# Patient Record
Sex: MALE | Race: WHITE | Employment: FULL TIME | URBAN - METROPOLITAN AREA
[De-identification: names, ages, dates, MRNs, and addresses within clinical notes are randomized per-mention and may not be internally consistent; named-entity substitution may affect disease eponyms.]

---

## 2022-02-01 ENCOUNTER — HOSPITAL ENCOUNTER (OUTPATIENT)
Age: 32
Discharge: HOME | End: 2022-02-01
Payer: COMMERCIAL

## 2022-02-01 ENCOUNTER — APPOINTMENT (OUTPATIENT)
Dept: GENERAL RADIOLOGY | Age: 32
End: 2022-02-01
Attending: PHYSICIAN ASSISTANT
Payer: COMMERCIAL

## 2022-02-01 VITALS
WEIGHT: 175 LBS | RESPIRATION RATE: 18 BRPM | HEART RATE: 84 BPM | SYSTOLIC BLOOD PRESSURE: 127 MMHG | TEMPERATURE: 98 F | HEIGHT: 71 IN | BODY MASS INDEX: 24.5 KG/M2 | DIASTOLIC BLOOD PRESSURE: 84 MMHG | OXYGEN SATURATION: 98 %

## 2022-02-01 DIAGNOSIS — M25.532 LEFT WRIST PAIN: ICD-10-CM

## 2022-02-01 DIAGNOSIS — S60.212A CONTUSION OF LEFT WRIST, INITIAL ENCOUNTER: Primary | ICD-10-CM

## 2022-02-01 PROCEDURE — 73110 X-RAY EXAM OF WRIST: CPT | Performed by: PHYSICIAN ASSISTANT

## 2022-02-01 PROCEDURE — 99203 OFFICE O/P NEW LOW 30 MIN: CPT | Performed by: PHYSICIAN ASSISTANT

## 2022-02-01 PROCEDURE — L3924 HFO WITHOUT JOINTS PRE OTS: HCPCS | Performed by: PHYSICIAN ASSISTANT

## 2022-02-01 NOTE — ED INITIAL ASSESSMENT (HPI)
Pt fell off work stool on Sunday laning onto l arm, pt stating that his L hand and wrist were swollen but he did not have pain, swelling down today but pain increasing, reddness note to hand

## 2023-06-30 ENCOUNTER — HOSPITAL ENCOUNTER (OUTPATIENT)
Age: 33
Discharge: HOME | End: 2023-06-30
Payer: COMMERCIAL

## 2023-06-30 VITALS
WEIGHT: 195 LBS | RESPIRATION RATE: 18 BRPM | TEMPERATURE: 98 F | OXYGEN SATURATION: 99 % | DIASTOLIC BLOOD PRESSURE: 90 MMHG | SYSTOLIC BLOOD PRESSURE: 143 MMHG | BODY MASS INDEX: 27.3 KG/M2 | HEART RATE: 68 BPM | HEIGHT: 71 IN

## 2023-06-30 DIAGNOSIS — M54.16 LUMBAR RADICULOPATHY: Primary | ICD-10-CM

## 2023-06-30 PROCEDURE — 99213 OFFICE O/P EST LOW 20 MIN: CPT | Performed by: PHYSICIAN ASSISTANT

## 2023-06-30 RX ORDER — PREDNISONE 10 MG/1
TABLET ORAL
Qty: 20 TABLET | Refills: 0 | Status: SHIPPED | OUTPATIENT
Start: 2023-06-30

## 2023-07-10 ENCOUNTER — OFFICE VISIT (OUTPATIENT)
Dept: FAMILY MEDICINE CLINIC | Facility: CLINIC | Age: 33
End: 2023-07-10
Payer: COMMERCIAL

## 2023-07-10 VITALS
OXYGEN SATURATION: 98 % | WEIGHT: 196 LBS | HEART RATE: 72 BPM | DIASTOLIC BLOOD PRESSURE: 76 MMHG | RESPIRATION RATE: 16 BRPM | SYSTOLIC BLOOD PRESSURE: 112 MMHG | HEIGHT: 71 IN | BODY MASS INDEX: 27.44 KG/M2

## 2023-07-10 DIAGNOSIS — M54.41 ACUTE RIGHT-SIDED LOW BACK PAIN WITH RIGHT-SIDED SCIATICA: Primary | ICD-10-CM

## 2023-07-10 PROCEDURE — 3074F SYST BP LT 130 MM HG: CPT | Performed by: FAMILY MEDICINE

## 2023-07-10 PROCEDURE — 3078F DIAST BP <80 MM HG: CPT | Performed by: FAMILY MEDICINE

## 2023-07-10 PROCEDURE — 99203 OFFICE O/P NEW LOW 30 MIN: CPT | Performed by: FAMILY MEDICINE

## 2023-07-10 PROCEDURE — 3008F BODY MASS INDEX DOCD: CPT | Performed by: FAMILY MEDICINE

## 2023-07-10 RX ORDER — CYCLOBENZAPRINE HCL 10 MG
10 TABLET ORAL 3 TIMES DAILY PRN
Qty: 30 TABLET | Refills: 1 | Status: SHIPPED | OUTPATIENT
Start: 2023-07-10 | End: 2023-07-30

## 2023-07-10 RX ORDER — NAPROXEN 500 MG/1
500 TABLET ORAL 2 TIMES DAILY WITH MEALS
Qty: 28 TABLET | Refills: 0 | Status: SHIPPED | OUTPATIENT
Start: 2023-07-10

## 2023-07-14 ENCOUNTER — TELEPHONE (OUTPATIENT)
Dept: PHYSICAL THERAPY | Facility: HOSPITAL | Age: 33
End: 2023-07-14

## 2023-07-16 ENCOUNTER — PATIENT MESSAGE (OUTPATIENT)
Dept: FAMILY MEDICINE CLINIC | Facility: CLINIC | Age: 33
End: 2023-07-16

## 2023-07-16 DIAGNOSIS — M54.41 ACUTE RIGHT-SIDED LOW BACK PAIN WITH RIGHT-SIDED SCIATICA: Primary | ICD-10-CM

## 2023-07-17 ENCOUNTER — HOSPITAL ENCOUNTER (EMERGENCY)
Facility: HOSPITAL | Age: 33
Discharge: HOME | End: 2023-07-17
Attending: EMERGENCY MEDICINE
Payer: COMMERCIAL

## 2023-07-17 VITALS
SYSTOLIC BLOOD PRESSURE: 142 MMHG | DIASTOLIC BLOOD PRESSURE: 80 MMHG | HEIGHT: 71 IN | OXYGEN SATURATION: 99 % | BODY MASS INDEX: 27.3 KG/M2 | WEIGHT: 195 LBS | TEMPERATURE: 98 F | RESPIRATION RATE: 18 BRPM | HEART RATE: 90 BPM

## 2023-07-17 DIAGNOSIS — M54.16 LUMBAR RADICULOPATHY: Primary | ICD-10-CM

## 2023-07-17 PROCEDURE — 96372 THER/PROPH/DIAG INJ SC/IM: CPT

## 2023-07-17 PROCEDURE — 99283 EMERGENCY DEPT VISIT LOW MDM: CPT

## 2023-07-17 PROCEDURE — 99284 EMERGENCY DEPT VISIT MOD MDM: CPT

## 2023-07-17 RX ORDER — METHOCARBAMOL 750 MG/1
750 TABLET, FILM COATED ORAL ONCE
Status: COMPLETED | OUTPATIENT
Start: 2023-07-17 | End: 2023-07-17

## 2023-07-17 RX ORDER — KETOROLAC TROMETHAMINE 30 MG/ML
60 INJECTION, SOLUTION INTRAMUSCULAR; INTRAVENOUS ONCE
Status: COMPLETED | OUTPATIENT
Start: 2023-07-17 | End: 2023-07-17

## 2023-07-17 RX ORDER — DEXAMETHASONE 4 MG/1
10 TABLET ORAL ONCE
Status: COMPLETED | OUTPATIENT
Start: 2023-07-17 | End: 2023-07-17

## 2023-07-17 RX ORDER — METHOCARBAMOL 750 MG/1
750 TABLET, FILM COATED ORAL 3 TIMES DAILY
Qty: 15 TABLET | Refills: 0 | Status: SHIPPED | OUTPATIENT
Start: 2023-07-17 | End: 2023-07-28

## 2023-07-17 RX ORDER — LIDOCAINE 50 MG/G
1 PATCH TOPICAL EVERY 24 HOURS
Qty: 12 EACH | Refills: 0 | Status: SHIPPED | OUTPATIENT
Start: 2023-07-17 | End: 2023-07-28

## 2023-07-17 RX ORDER — HYDROCODONE BITARTRATE AND ACETAMINOPHEN 5; 325 MG/1; MG/1
1 TABLET ORAL ONCE
Status: COMPLETED | OUTPATIENT
Start: 2023-07-17 | End: 2023-07-17

## 2023-07-17 NOTE — DISCHARGE INSTRUCTIONS
Return for new or worsening symptoms such as weakness, complete numbness especially in the groin, incontinence, fever, vomiting or abdominal pain. Followup with primary care as you may need further testing and/or treatment such as physical therapy or MRI if your symptoms do not improve. You can take acetaminophen 500 mg every 4-6 hours for pain as needed in addition to the naproxen.

## 2023-07-17 NOTE — ED INITIAL ASSESSMENT (HPI)
Patient to ED with c/o right lower back pain that radiates down into his leg. Patient states pain began in June and has seen doctor multiple times for same complaint. Steroids and muscle relaxers given without relief.

## 2023-07-18 ENCOUNTER — PATIENT OUTREACH (OUTPATIENT)
Dept: CASE MANAGEMENT | Age: 33
End: 2023-07-18

## 2023-07-18 ENCOUNTER — TELEPHONE (OUTPATIENT)
Dept: FAMILY MEDICINE CLINIC | Facility: CLINIC | Age: 33
End: 2023-07-18

## 2023-07-18 NOTE — TELEPHONE ENCOUNTER
From: Sheree Pena  To: Marzena Jones MD  Sent: 7/16/2023 10:11 AM CDT  Subject: Continued Severe Pain    Good morning,    I was recently seen for severe back pain radiating to my leg. Previously to my office visit I was seen at urgent care and was prescribed steroids. I finished the steroid medication and have been taking the medication prescribed in your office including the muscle relaxers. I have also seen a chiropractor 4 times. I am starting physical therapy on Wednesday (earliest appointment day they had). The pain continues to be unbearable and I am having a very difficult time walking without limping and sitting. I have been doing exercises at home and that has not been helping either. I am also wearing a support back belt at all times. Is there anything else that I can do for the pain? Do I need to wait for the PT to finish to get an MRI? This pain has been severe and consistent for 7 weeks now and I am quite worried. I was wondering if i need to have any possible bloodwork done and if that would potentially provide any answers. Please advise.

## 2023-07-18 NOTE — TELEPHONE ENCOUNTER
SEEN  YESTERDAY IN ER FOR BACK PAIN. [facility]. WIFE IS CALLING BECAUSE SHE WANTS NORCO [facility] UNITL THEY SEE DR SALES FOR THE F/U VISIT. SHE IS AFRAID THE BACK PAIN WILL BE COMING BACK AND HE WON'T BE ABLE TO HANDLE THE PAIN.       [facility] AND [facility]

## 2023-07-18 NOTE — PROGRESS NOTES
1st attempt ED f/up apt request    Dr. Kandy Harper   PCP  [address]  [address]  Follow up in 1 week  Apt: July 28 @10am         Ryland Stokes [address] [phone]  Get PCP ref to schedule     Confirmed w pt  Closing encounter

## 2023-07-18 NOTE — TELEPHONE ENCOUNTER
Pt in ER for back pain  Given methocarbamol and lidocaine patch for home  Given norco x1 tab in ER    Has f/u appt 7/28/23    Requesting Gene Alarcon until appt. Approve/deny?

## 2023-07-19 ENCOUNTER — OFFICE VISIT (OUTPATIENT)
Facility: LOCATION | Age: 33
End: 2023-07-19
Attending: FAMILY MEDICINE
Payer: COMMERCIAL

## 2023-07-19 DIAGNOSIS — M54.41 ACUTE RIGHT-SIDED LOW BACK PAIN WITH RIGHT-SIDED SCIATICA: Primary | ICD-10-CM

## 2023-07-19 PROCEDURE — 97110 THERAPEUTIC EXERCISES: CPT

## 2023-07-19 PROCEDURE — 97161 PT EVAL LOW COMPLEX 20 MIN: CPT

## 2023-07-19 RX ORDER — HYDROCODONE BITARTRATE AND ACETAMINOPHEN 5; 325 MG/1; MG/1
1 TABLET ORAL EVERY 6 HOURS PRN
Qty: 20 TABLET | Refills: 0 | Status: SHIPPED | OUTPATIENT
Start: 2023-07-19 | End: 2023-07-24

## 2023-07-23 ENCOUNTER — PATIENT MESSAGE (OUTPATIENT)
Dept: FAMILY MEDICINE CLINIC | Facility: CLINIC | Age: 33
End: 2023-07-23

## 2023-07-23 DIAGNOSIS — M54.41 ACUTE RIGHT-SIDED LOW BACK PAIN WITH RIGHT-SIDED SCIATICA: ICD-10-CM

## 2023-07-24 RX ORDER — HYDROCODONE BITARTRATE AND ACETAMINOPHEN 5; 325 MG/1; MG/1
1 TABLET ORAL EVERY 6 HOURS PRN
Qty: 20 TABLET | Refills: 0 | Status: SHIPPED | OUTPATIENT
Start: 2023-07-24

## 2023-07-24 NOTE — TELEPHONE ENCOUNTER
From: Silva Gonzalez  To: Chino Pillai MD  Sent: 7/23/2023 9:45 AM CDT  Subject: Medication Refill    Good morning,    I am reaching out regarding my Hydrocodone prescription. I only have enough until tomorrow. This is the only thing that lessens my pain so that I can walk, sit and do my exercises at home. My pain was so severe at my last physical therapy appointment (before the medication) that the therapist was not able to do much with me. The pain is not lessening even with me doing the exercises the therapist showed me to do at home and continued chiropractic care. Are you able to refill the prescription so that I have enough for pain management until I come in for my visit on Friday? Also, is there any way to request bloodwork to be done before I come in on Friday? I would like to start ruling some things out as this weekend marks the 8th week of this pain with no improvement.      Thank you,    Mat

## 2023-07-26 ENCOUNTER — OFFICE VISIT (OUTPATIENT)
Facility: LOCATION | Age: 33
End: 2023-07-26
Attending: FAMILY MEDICINE
Payer: COMMERCIAL

## 2023-07-26 PROCEDURE — 97140 MANUAL THERAPY 1/> REGIONS: CPT

## 2023-07-26 PROCEDURE — 97110 THERAPEUTIC EXERCISES: CPT

## 2023-07-26 NOTE — PROGRESS NOTES
Diagnosis:   Acute right-sided low back pain with right-sided sciatica (M54.41)    Referring Provider: Sharif Escalante  Date of Evaluation:    7/19/23    Precautions:  None Next MD visit:   Date of Surgery: n/a   Insurance Primary/Secondary: [employer] / N/A     # Auth Visits: 12            Subjective: reached out to physician and is now on stronger pain medication; doing exercises from PT & chiropractor     Pain: 5/10      Objective:   See flowsheet      Assessment:   Significant neural tension throughout (R) LE. Very tight HS and posterior hip musculature. Pt does get some relief with prone lying while he is working at home as well as some symptom improvement with lumbar extension exercise. Fair tolerance to exercises.  Does demonstrate improved ambulatory pattern however not sure if this is treatment related versus pain medication      Goals:   Pt will improve transversus abdominis recruitment to perform proper isometric contraction without requiring verbal or tactile cuing to promote advancement of therex   Pt will demonstrate good understanding of proper posture and body mechanics to decrease pain and improve spinal safety   Pt will report improved symptom centralization and absence of radicular symptoms for 3 consecutive days to improve function with ADL   Pt will have decreased paraspinal mm tension to tolerate standing >30 minutes for home activities   Pt will demonstrate at least 4/5 (R) ankle PF strength in order to optimize walking, steps, etc  Pt will demonstrate at least 4/5 (B) hip strength in order to maximize effectiveness and efficiency with functional activities   Pt will demonstrate ability to cross leg in order to don/doff socks and shoes with less restriction and pain  Pt will perform all ADLs and light household chores/tasks with less pain and restriction  Pt will verbalize and demonstrate proper body mechanics to  things from lower level surfaces  Pt will be able to get into and out of car with more ease and less pain  Pt will be able to sit (with intermittent breaks) at work for 6-8 hour day with less reported pain and use of lumbar support   Pt will be independent and compliant with comprehensive HEP to maintain progress achieved in PT      Plan: continue to work on centralizing symptoms, improve LE flexibility/mobility  Date: 7/26/2023  TX#: 2/12 Date:                 TX#: 3/ Date:                 TX#: 4/ Date:                 TX#: 5/ Date: Tx#: 6/   Manual Therapy       STM/massage gun to (R) posterior hip musculature        TherEx       Prone on elbows x 1 min       Prone press up x 20       PT assisted piriformis & HS stretch       Supine HS stretch 5 seconds x 20       Supine sciatic nerve glide on YPB x 30                     HEP: Prone on elbows, prone press up, standing lumbar extension, supine piriformis stretch    Charges: Manual 1;  TherEx 2       Total Timed Treatment: 45 min  Total Treatment Time: 45 min

## 2023-07-28 ENCOUNTER — OFFICE VISIT (OUTPATIENT)
Facility: LOCATION | Age: 33
End: 2023-07-28
Attending: FAMILY MEDICINE
Payer: COMMERCIAL

## 2023-07-28 ENCOUNTER — HOSPITAL ENCOUNTER (OUTPATIENT)
Dept: GENERAL RADIOLOGY | Age: 33
Discharge: HOME | End: 2023-07-28
Attending: FAMILY MEDICINE
Payer: COMMERCIAL

## 2023-07-28 ENCOUNTER — OFFICE VISIT (OUTPATIENT)
Dept: FAMILY MEDICINE CLINIC | Facility: CLINIC | Age: 33
End: 2023-07-28
Payer: COMMERCIAL

## 2023-07-28 VITALS
BODY MASS INDEX: 27.3 KG/M2 | WEIGHT: 195 LBS | OXYGEN SATURATION: 98 % | RESPIRATION RATE: 20 BRPM | SYSTOLIC BLOOD PRESSURE: 144 MMHG | DIASTOLIC BLOOD PRESSURE: 94 MMHG | HEIGHT: 71 IN | HEART RATE: 82 BPM

## 2023-07-28 DIAGNOSIS — M54.16 LUMBAR RADICULOPATHY: ICD-10-CM

## 2023-07-28 DIAGNOSIS — Z00.00 LABORATORY EXAMINATION ORDERED AS PART OF A COMPLETE PHYSICAL EXAMINATION: ICD-10-CM

## 2023-07-28 DIAGNOSIS — R29.898 RIGHT LEG WEAKNESS: ICD-10-CM

## 2023-07-28 DIAGNOSIS — M54.16 LUMBAR RADICULOPATHY: Primary | ICD-10-CM

## 2023-07-28 PROCEDURE — 72110 X-RAY EXAM L-2 SPINE 4/>VWS: CPT | Performed by: FAMILY MEDICINE

## 2023-07-28 PROCEDURE — 3077F SYST BP >= 140 MM HG: CPT | Performed by: FAMILY MEDICINE

## 2023-07-28 PROCEDURE — 3008F BODY MASS INDEX DOCD: CPT | Performed by: FAMILY MEDICINE

## 2023-07-28 PROCEDURE — 97140 MANUAL THERAPY 1/> REGIONS: CPT

## 2023-07-28 PROCEDURE — 3080F DIAST BP >= 90 MM HG: CPT | Performed by: FAMILY MEDICINE

## 2023-07-28 PROCEDURE — 99213 OFFICE O/P EST LOW 20 MIN: CPT | Performed by: FAMILY MEDICINE

## 2023-07-28 PROCEDURE — 97110 THERAPEUTIC EXERCISES: CPT

## 2023-07-28 RX ORDER — MELOXICAM 15 MG/1
15 TABLET ORAL DAILY PRN
Qty: 30 TABLET | Refills: 0 | Status: SHIPPED | OUTPATIENT
Start: 2023-07-28

## 2023-07-28 RX ORDER — HYDROCODONE BITARTRATE AND ACETAMINOPHEN 7.5; 325 MG/1; MG/1
1 TABLET ORAL EVERY 8 HOURS PRN
Qty: 24 TABLET | Refills: 0 | Status: SHIPPED | OUTPATIENT
Start: 2023-07-28

## 2023-07-28 NOTE — PROGRESS NOTES
Subjective:   Patient ID: Renay Hinds is a 35year old male. June 3rd with started with back pain. Pain 9/10. Got ping pong table and taking it down the stairs. Pain radiating down the right leg.  + numbness and tingling. No previous back injuries. Hx of occasional low back pain that self resolved in 2 weeks, but no history of radicular symptoms. Done PT x 2 weeks, chiropracter x 5 weeks, done steroids twice, done muscle relaxer, nsaids, and norco, all without any significant relief. History/Other:   Review of Systems   All other systems reviewed and are negative. Current Outpatient Medications   Medication Sig Dispense Refill    Meloxicam 15 MG Oral Tab Take 1 tablet (15 mg total) by mouth daily as needed for Pain. 30 tablet 0    HYDROcodone-acetaminophen (NORCO) 7.5-325 MG Oral Tab Take 1 tablet by mouth every 8 (eight) hours as needed for Pain. 24 tablet 0    HYDROcodone-acetaminophen (NORCO) 5-325 MG Oral Tab Take 1 tablet by mouth every 6 (six) hours as needed for Pain. 20 tablet 0    naproxen 500 MG Oral Tab Take 1 tablet (500 mg total) by mouth 2 (two) times daily with meals. 28 tablet 0    cyclobenzaprine 10 MG Oral Tab Take 1 tablet (10 mg total) by mouth 3 (three) times daily as needed. 30 tablet 1     Allergies:No Known Allergies    Objective:   Physical Exam  Vitals reviewed. Constitutional:       General: He is not in acute distress. Appearance: He is well-developed. He is not diaphoretic. Eyes:      General: No scleral icterus. Right eye: No discharge. Left eye: No discharge. Conjunctiva/sclera: Conjunctivae normal.   Cardiovascular:      Rate and Rhythm: Normal rate and regular rhythm. Heart sounds: Normal heart sounds. Pulmonary:      Effort: Pulmonary effort is normal. No respiratory distress. Breath sounds: Normal breath sounds. No wheezing or rales. Musculoskeletal:      Comments: Unable to do ROM fully due to pain.   Pacing constantly as unable to sit or stand still as pain is then worse. Tenderness midline, causing pain to shoot down into right post thigh. Straight leg test causing pain into the right leg. Weakness in right leg extension and pushing off on right heel. Assessment & Plan:   Lumbar radiculopathy  (primary encounter diagnosis)  Right leg weakness  Laboratory examination ordered as part of a complete physical examination  + straight leg test. + right leg weakness. Already done chiropracter, nsaids, muscle relaxer, oral steroids. Started PT 3 visit already with no relief. Needs MRI at this point. Orders Placed This Encounter      Lipid Panel      CBC With Differential With Platelet      Comp Metabolic Panel (14)      TSH W Reflex To Free T4      Meds This Visit:  Requested Prescriptions     Signed Prescriptions Disp Refills    Meloxicam 15 MG Oral Tab 30 tablet 0     Sig: Take 1 tablet (15 mg total) by mouth daily as needed for Pain. HYDROcodone-acetaminophen (NORCO) 7.5-325 MG Oral Tab 24 tablet 0     Sig: Take 1 tablet by mouth every 8 (eight) hours as needed for Pain.        Imaging & Referrals:  OP REFERRAL PAIN MANGEMENT  XR LUMBAR SPINE (MIN 4 VIEWS) (CPT=72110)  MRI SPINE LUMBAR (CPT=72148)

## 2023-07-28 NOTE — PROGRESS NOTES
Diagnosis:   Acute right-sided low back pain with right-sided sciatica (M54.41)    Referring Provider: Marilou Encarnacion  Date of Evaluation:    7/19/23    Precautions:  None Next MD visit:   Date of Surgery: n/a   Insurance Primary/Secondary: [employer] / N/A     # Auth Visits: 12            Subjective: feels about the same; pain shooting down back of (R) leg     Pain: 7/10      Objective:   See flowsheet      Assessment:   Significant tightness (R) posterolateral hip, HS, calf. Neural tension throughout (R) LE.  Does get some relief with piriformis release, posterior hip stretching and press up activities   Antalgic gait persists, very weak plantar flexion strength        Goals:   Pt will improve transversus abdominis recruitment to perform proper isometric contraction without requiring verbal or tactile cuing to promote advancement of therex   Pt will demonstrate good understanding of proper posture and body mechanics to decrease pain and improve spinal safety   Pt will report improved symptom centralization and absence of radicular symptoms for 3 consecutive days to improve function with ADL   Pt will have decreased paraspinal mm tension to tolerate standing >30 minutes for home activities   Pt will demonstrate at least 4/5 (R) ankle PF strength in order to optimize walking, steps, etc  Pt will demonstrate at least 4/5 (B) hip strength in order to maximize effectiveness and efficiency with functional activities   Pt will demonstrate ability to cross leg in order to don/doff socks and shoes with less restriction and pain  Pt will perform all ADLs and light household chores/tasks with less pain and restriction  Pt will verbalize and demonstrate proper body mechanics to  things from lower level surfaces  Pt will be able to get into and out of car with more ease and less pain  Pt will be able to sit (with intermittent breaks) at work for 6-8 hour day with less reported pain and use of lumbar support   Pt will be independent and compliant with comprehensive HEP to maintain progress achieved in PT      Plan: continue to work on centralizing symptoms, improve LE flexibility/mobility  Date: 7/26/2023  TX#: 2/12 Date: 7/28/23               TX#: 3/12 Date:                 TX#: 4/ Date:                 TX#: 5/ Date: Tx#: 6/   Manual Therapy Manual Therapy      STM/massage gun to (R) posterior hip musculature  STM/massage gun to (R) posterior hip musculature       TherEx TherEx      Prone on elbows x 1 min Prone on elbows x 1 min      Prone press up x 20 Prone press up x 20      PT assisted piriformis & HS stretch PT assisted piriformis & HS stretch      Supine HS stretch 5 seconds x 20 Supine HS stretch 5 seconds x 20      Supine sciatic nerve glide on YPB x 30 Supine sciatic nerve glide on YPB x 30       Side glides at wall x 20       SB calf stretch 30 sec x 3      HEP: Prone on elbows, prone press up, standing lumbar extension, supine piriformis stretch    Charges: Manual 1;  TherEx 2       Total Timed Treatment: 45 min  Total Treatment Time: 45 min

## 2023-07-29 ENCOUNTER — LAB ENCOUNTER (OUTPATIENT)
Dept: LAB | Facility: HOSPITAL | Age: 33
End: 2023-07-29
Attending: FAMILY MEDICINE
Payer: COMMERCIAL

## 2023-07-29 DIAGNOSIS — Z00.00 LABORATORY EXAMINATION ORDERED AS PART OF A COMPLETE PHYSICAL EXAMINATION: ICD-10-CM

## 2023-07-29 LAB
ALBUMIN SERPL-MCNC: 4.3 G/DL (ref 3.4–5)
ALBUMIN/GLOB SERPL: 1.1 {RATIO} (ref 1–2)
ALP LIVER SERPL-CCNC: 68 U/L
ALT SERPL-CCNC: 31 U/L
ANION GAP SERPL CALC-SCNC: 4 MMOL/L (ref 0–18)
AST SERPL-CCNC: 11 U/L (ref 15–37)
BASOPHILS # BLD AUTO: 0.08 X10(3) UL (ref 0–0.2)
BASOPHILS NFR BLD AUTO: 1 %
BILIRUB SERPL-MCNC: 0.5 MG/DL (ref 0.1–2)
BUN BLD-MCNC: 24 MG/DL (ref 7–18)
CALCIUM BLD-MCNC: 9.1 MG/DL (ref 8.5–10.1)
CHLORIDE SERPL-SCNC: 106 MMOL/L (ref 98–112)
CHOLEST SERPL-MCNC: 157 MG/DL (ref ?–200)
CO2 SERPL-SCNC: 27 MMOL/L (ref 21–32)
CREAT BLD-MCNC: 1.03 MG/DL
EGFRCR SERPLBLD CKD-EPI 2021: 98 ML/MIN/1.73M2 (ref 60–?)
EOSINOPHIL # BLD AUTO: 0.55 X10(3) UL (ref 0–0.7)
EOSINOPHIL NFR BLD AUTO: 6.7 %
ERYTHROCYTE [DISTWIDTH] IN BLOOD BY AUTOMATED COUNT: 12.6 %
FASTING PATIENT LIPID ANSWER: YES
FASTING STATUS PATIENT QL REPORTED: YES
GLOBULIN PLAS-MCNC: 3.8 G/DL (ref 2.8–4.4)
GLUCOSE BLD-MCNC: 100 MG/DL (ref 70–99)
HCT VFR BLD AUTO: 45.1 %
HDLC SERPL-MCNC: 45 MG/DL (ref 40–59)
HGB BLD-MCNC: 14.9 G/DL
IMM GRANULOCYTES # BLD AUTO: 0.02 X10(3) UL (ref 0–1)
IMM GRANULOCYTES NFR BLD: 0.2 %
LDLC SERPL CALC-MCNC: 95 MG/DL (ref ?–100)
LYMPHOCYTES # BLD AUTO: 2.62 X10(3) UL (ref 1–4)
LYMPHOCYTES NFR BLD AUTO: 31.9 %
MCH RBC QN AUTO: 28.8 PG (ref 26–34)
MCHC RBC AUTO-ENTMCNC: 33 G/DL (ref 31–37)
MCV RBC AUTO: 87.2 FL
MONOCYTES # BLD AUTO: 0.75 X10(3) UL (ref 0.1–1)
MONOCYTES NFR BLD AUTO: 9.1 %
NEUTROPHILS # BLD AUTO: 4.2 X10 (3) UL (ref 1.5–7.7)
NEUTROPHILS # BLD AUTO: 4.2 X10(3) UL (ref 1.5–7.7)
NEUTROPHILS NFR BLD AUTO: 51.1 %
NONHDLC SERPL-MCNC: 112 MG/DL (ref ?–130)
OSMOLALITY SERPL CALC.SUM OF ELEC: 288 MOSM/KG (ref 275–295)
PLATELET # BLD AUTO: 294 10(3)UL (ref 150–450)
POTASSIUM SERPL-SCNC: 4.7 MMOL/L (ref 3.5–5.1)
PROT SERPL-MCNC: 8.1 G/DL (ref 6.4–8.2)
RBC # BLD AUTO: 5.17 X10(6)UL
SODIUM SERPL-SCNC: 137 MMOL/L (ref 136–145)
TRIGL SERPL-MCNC: 89 MG/DL (ref 30–149)
TSI SER-ACNC: 1.28 MIU/ML (ref 0.36–3.74)
VLDLC SERPL CALC-MCNC: 15 MG/DL (ref 0–30)
WBC # BLD AUTO: 8.2 X10(3) UL (ref 4–11)

## 2023-07-29 PROCEDURE — 84443 ASSAY THYROID STIM HORMONE: CPT

## 2023-07-29 PROCEDURE — 80061 LIPID PANEL: CPT

## 2023-07-29 PROCEDURE — 85025 COMPLETE CBC W/AUTO DIFF WBC: CPT

## 2023-07-29 PROCEDURE — 80053 COMPREHEN METABOLIC PANEL: CPT

## 2023-07-29 PROCEDURE — 36415 COLL VENOUS BLD VENIPUNCTURE: CPT

## 2023-08-01 ENCOUNTER — OFFICE VISIT (OUTPATIENT)
Facility: LOCATION | Age: 33
End: 2023-08-01
Attending: FAMILY MEDICINE
Payer: COMMERCIAL

## 2023-08-01 PROCEDURE — 97110 THERAPEUTIC EXERCISES: CPT

## 2023-08-01 PROCEDURE — 97140 MANUAL THERAPY 1/> REGIONS: CPT

## 2023-08-01 NOTE — PROGRESS NOTES
Diagnosis:   Acute right-sided low back pain with right-sided sciatica (M54.41)    Referring Provider: Montrell Parrish  Date of Evaluation:    7/19/23    Precautions:  None Next MD visit:   Date of Surgery: n/a   Insurance Primary/Secondary: [place] / N/A     # Auth Visits: 12            Subjective: a bit more flexible but still in same pain; scheduled for MRI on Friday    Pain: 7/10      Objective:   See flowsheet      Assessment:   Significant tightness (R) posterolateral hip, HS, calf. Dural tension and hyperirritability   Very weak PF. Lumbar extension helps with low back pain but still having symptoms down the leg.   Reports some modest improvement in flexibility  Using lumbar support for sitting         Goals:   Pt will improve transversus abdominis recruitment to perform proper isometric contraction without requiring verbal or tactile cuing to promote advancement of therex   Pt will demonstrate good understanding of proper posture and body mechanics to decrease pain and improve spinal safety   Pt will report improved symptom centralization and absence of radicular symptoms for 3 consecutive days to improve function with ADL   Pt will have decreased paraspinal mm tension to tolerate standing >30 minutes for home activities   Pt will demonstrate at least 4/5 (R) ankle PF strength in order to optimize walking, steps, etc  Pt will demonstrate at least 4/5 (B) hip strength in order to maximize effectiveness and efficiency with functional activities   Pt will demonstrate ability to cross leg in order to don/doff socks and shoes with less restriction and pain  Pt will perform all ADLs and light household chores/tasks with less pain and restriction  Pt will verbalize and demonstrate proper body mechanics to  things from lower level surfaces  Pt will be able to get into and out of car with more ease and less pain  Pt will be able to sit (with intermittent breaks) at work for 6-8 hour day with less reported pain and use of lumbar support   Pt will be independent and compliant with comprehensive HEP to maintain progress achieved in PT      Plan: continue to work on centralizing symptoms, improve LE flexibility/mobility  Date: 7/26/2023  TX#: 2/12 Date: 7/28/23               TX#: 3/12 Date:  8/1/23               TX#: 4/12 Date:                 TX#: 5/ Date: Tx#: 6/   Manual Therapy Manual Therapy Manual Therapy     STM/massage gun to (R) posterior hip musculature  STM/massage gun to (R) posterior hip musculature  STM/massage gun to (R) posterior hip musculature      TherEx TherEx TherEx     Prone on elbows x 1 min Prone on elbows x 1 min Prone press ups x 30     Prone press up x 20 Prone press up x 20 Prone on elbows x 1 min     PT assisted piriformis & HS stretch PT assisted piriformis & HS stretch PPU w/knee bent x 10     Supine HS stretch 5 seconds x 20 Supine HS stretch 5 seconds x 20 Supine HS stretch 5 sec x 20     Supine sciatic nerve glide on YPB x 30 Supine sciatic nerve glide on YPB x 30 Supine sciatic nerve glide on YPB x 30      Side glides at wall x 20 Supine piriformis stretch 30 sec x 3      SB calf stretch 30 sec x 3      HEP: Prone on elbows, prone press up, standing lumbar extension, supine piriformis stretch    Charges: Manual 1;  TherEx 2       Total Timed Treatment: 45 min  Total Treatment Time: 45 min

## 2023-08-04 ENCOUNTER — HOSPITAL ENCOUNTER (OUTPATIENT)
Dept: MRI IMAGING | Facility: HOSPITAL | Age: 33
Discharge: HOME | End: 2023-08-04
Attending: FAMILY MEDICINE
Payer: COMMERCIAL

## 2023-08-04 ENCOUNTER — OFFICE VISIT (OUTPATIENT)
Facility: LOCATION | Age: 33
End: 2023-08-04
Attending: FAMILY MEDICINE
Payer: COMMERCIAL

## 2023-08-04 DIAGNOSIS — M54.16 LUMBAR RADICULOPATHY: ICD-10-CM

## 2023-08-04 DIAGNOSIS — R29.898 RIGHT LEG WEAKNESS: ICD-10-CM

## 2023-08-04 PROCEDURE — 97110 THERAPEUTIC EXERCISES: CPT

## 2023-08-04 PROCEDURE — 72148 MRI LUMBAR SPINE W/O DYE: CPT | Performed by: FAMILY MEDICINE

## 2023-08-04 NOTE — PROGRESS NOTES
Diagnosis:   Acute right-sided low back pain with right-sided sciatica (M54.41)    Referring Provider: Liliam Asif  Date of Evaluation:    7/19/23    Precautions:  None Next MD visit:   Date of Surgery: n/a   Insurance Primary/Secondary: [place] / N/A     # Auth Visits: 12            Subjective: pain in the back and down the leg continues; has hard time sitting and putting weight through the leg     Pain: 7/10      Objective:   See flowsheet      Assessment:   Pt with continued LE weakness (PF significantly impaired)  Did well with core and hip exercises implemented  Did have some good relief with traction, but poor carryover        Goals:   Pt will improve transversus abdominis recruitment to perform proper isometric contraction without requiring verbal or tactile cuing to promote advancement of therex   Pt will demonstrate good understanding of proper posture and body mechanics to decrease pain and improve spinal safety   Pt will report improved symptom centralization and absence of radicular symptoms for 3 consecutive days to improve function with ADL   Pt will have decreased paraspinal mm tension to tolerate standing >30 minutes for home activities   Pt will demonstrate at least 4/5 (R) ankle PF strength in order to optimize walking, steps, etc  Pt will demonstrate at least 4/5 (B) hip strength in order to maximize effectiveness and efficiency with functional activities   Pt will demonstrate ability to cross leg in order to don/doff socks and shoes with less restriction and pain  Pt will perform all ADLs and light household chores/tasks with less pain and restriction  Pt will verbalize and demonstrate proper body mechanics to  things from lower level surfaces  Pt will be able to get into and out of car with more ease and less pain  Pt will be able to sit (with intermittent breaks) at work for 6-8 hour day with less reported pain and use of lumbar support   Pt will be independent and compliant with comprehensive HEP to maintain progress achieved in PT      Plan: continue to work on centralizing symptoms, improve LE flexibility/mobility  Date: 7/26/2023  TX#: 2/12 Date: 7/28/23               TX#: 3/12 Date:  8/1/23               TX#: 4/12 Date: 8/4/23                TX#: 5/12 Date: Tx#: 6/   Manual Therapy Manual Therapy Manual Therapy Manual Therapy    STM/massage gun to (R) posterior hip musculature  STM/massage gun to (R) posterior hip musculature  STM/massage gun to (R) posterior hip musculature      TherEx TherEx TherEx TherEx    Prone on elbows x 1 min Prone on elbows x 1 min Prone press ups x 30 Press ups x 20 (x2)    Prone press up x 20 Prone press up x 20 Prone on elbows x 1 min Supine HS stretch 5 sec x 20    PT assisted piriformis & HS stretch PT assisted piriformis & HS stretch PPU w/knee bent x 10 Supine sciatic nerve glides on YPB x 30    Supine HS stretch 5 seconds x 20 Supine HS stretch 5 seconds x 20 Supine HS stretch 5 sec x 20 Supine TrA bracing x 15 (5 second hold)    Supine sciatic nerve glide on YPB x 30 Supine sciatic nerve glide on YPB x 30 Supine sciatic nerve glide on YPB x 30 Supine TrA bracing pushing YPB into legs x 10 (5 second hold)      Side glides at wall x 20 Supine piriformis stretch 30 sec x 3 Bridge x 15     SB calf stretch 30 sec x 3  Supine clam GTB x 10       SL clam GTB x 10 each side        Lumbar traction by PT     HEP: Prone on elbows, prone press up, standing lumbar extension, supine piriformis stretch    Charges:  TherEx 3       Total Timed Treatment: 45 min  Total Treatment Time: 45 min

## 2023-08-07 ENCOUNTER — OFFICE VISIT (OUTPATIENT)
Dept: FAMILY MEDICINE CLINIC | Facility: CLINIC | Age: 33
End: 2023-08-07
Payer: COMMERCIAL

## 2023-08-07 ENCOUNTER — APPOINTMENT (OUTPATIENT)
Facility: LOCATION | Age: 33
End: 2023-08-07
Attending: FAMILY MEDICINE
Payer: COMMERCIAL

## 2023-08-07 VITALS
HEART RATE: 78 BPM | WEIGHT: 190 LBS | HEIGHT: 71 IN | OXYGEN SATURATION: 97 % | RESPIRATION RATE: 16 BRPM | BODY MASS INDEX: 26.6 KG/M2 | DIASTOLIC BLOOD PRESSURE: 84 MMHG | SYSTOLIC BLOOD PRESSURE: 124 MMHG

## 2023-08-07 DIAGNOSIS — M51.26 LUMBAR DISC HERNIATION: Primary | ICD-10-CM

## 2023-08-07 DIAGNOSIS — M54.16 LUMBAR RADICULOPATHY: ICD-10-CM

## 2023-08-07 PROCEDURE — 3079F DIAST BP 80-89 MM HG: CPT | Performed by: FAMILY MEDICINE

## 2023-08-07 PROCEDURE — 3008F BODY MASS INDEX DOCD: CPT | Performed by: FAMILY MEDICINE

## 2023-08-07 PROCEDURE — 3074F SYST BP LT 130 MM HG: CPT | Performed by: FAMILY MEDICINE

## 2023-08-07 PROCEDURE — 99213 OFFICE O/P EST LOW 20 MIN: CPT | Performed by: FAMILY MEDICINE

## 2023-08-08 ENCOUNTER — OFFICE VISIT (OUTPATIENT)
Dept: PAIN CLINIC | Facility: CLINIC | Age: 33
End: 2023-08-08
Payer: COMMERCIAL

## 2023-08-08 VITALS
BODY MASS INDEX: 27 KG/M2 | DIASTOLIC BLOOD PRESSURE: 90 MMHG | HEART RATE: 84 BPM | WEIGHT: 190 LBS | SYSTOLIC BLOOD PRESSURE: 124 MMHG | OXYGEN SATURATION: 98 %

## 2023-08-08 DIAGNOSIS — M54.16 LUMBAR RADICULOPATHY: ICD-10-CM

## 2023-08-08 DIAGNOSIS — M51.26 HNP (HERNIATED NUCLEUS PULPOSUS), LUMBAR: Primary | ICD-10-CM

## 2023-08-08 PROCEDURE — 3080F DIAST BP >= 90 MM HG: CPT | Performed by: PHYSICIAN ASSISTANT

## 2023-08-08 PROCEDURE — 99204 OFFICE O/P NEW MOD 45 MIN: CPT | Performed by: PHYSICIAN ASSISTANT

## 2023-08-08 PROCEDURE — 3074F SYST BP LT 130 MM HG: CPT | Performed by: PHYSICIAN ASSISTANT

## 2023-08-09 ENCOUNTER — APPOINTMENT (OUTPATIENT)
Facility: LOCATION | Age: 33
End: 2023-08-09
Attending: FAMILY MEDICINE
Payer: COMMERCIAL

## 2023-08-10 ENCOUNTER — OFFICE VISIT (OUTPATIENT)
Dept: ORTHOPEDICS CLINIC | Facility: CLINIC | Age: 33
End: 2023-08-10
Payer: COMMERCIAL

## 2023-08-10 ENCOUNTER — TELEPHONE (OUTPATIENT)
Dept: ORTHOPEDICS CLINIC | Facility: CLINIC | Age: 33
End: 2023-08-10

## 2023-08-10 VITALS — HEIGHT: 71 IN | BODY MASS INDEX: 26.6 KG/M2 | WEIGHT: 190 LBS

## 2023-08-10 DIAGNOSIS — M51.26 LUMBAR DISC HERNIATION: Primary | ICD-10-CM

## 2023-08-10 PROCEDURE — 99205 OFFICE O/P NEW HI 60 MIN: CPT | Performed by: STUDENT IN AN ORGANIZED HEALTH CARE EDUCATION/TRAINING PROGRAM

## 2023-08-10 PROCEDURE — 3008F BODY MASS INDEX DOCD: CPT | Performed by: STUDENT IN AN ORGANIZED HEALTH CARE EDUCATION/TRAINING PROGRAM

## 2023-08-10 RX ORDER — MELOXICAM 15 MG/1
15 TABLET ORAL DAILY
Qty: 30 TABLET | Refills: 0 | Status: SHIPPED | OUTPATIENT
Start: 2023-08-10

## 2023-08-10 RX ORDER — METHYLPREDNISOLONE 4 MG/1
TABLET ORAL
Qty: 21 TABLET | Refills: 0 | Status: SHIPPED | OUTPATIENT
Start: 2023-08-10

## 2023-08-10 NOTE — PROGRESS NOTES
SURGERY SCHEDULING SHEET    Hermelinda aMyfield  7/16/1990  [id number]    Procedure: Right L5-S1 microdiscectomy (02328)    Diagnosis:  Lumbar disc herniation     Anesthesia: General    Length of Surgery: 2 hrs    Disposition: Outpatient    Assist: Timothy Dale    OR Table: Carlyle El    Position: Prone    Implant:  None    C-Arm: Yes    Special Equipment: None    Neuromonitoring: None    Pre-op Testing: PER ANESTHESIA GUIDELINES    Clearance: OTHER:  PCP    Post op: 2 weeks post op    Home health: Keon Lawrence MD  [address] [facility]  Phone: [phone]  Fax: [phone]

## 2023-08-10 NOTE — TELEPHONE ENCOUNTER
Date of Surgery: 9/6/23      Post Op Appt:  9/22/23 @ 7AM     Case ID: [id number]     Notes: [facility] REQUESTED           SURGERY SCHEDULING SHEET     Juliette Cabot  7/16/1990  [id number]     Procedure: Right L5-S1 microdiscectomy (00418)     Diagnosis:  Lumbar disc herniation      Anesthesia: General     Length of Surgery: 2 hrs     Disposition: Outpatient     Assist: Vel Bettencourt     OR Table: Jhonny Henao     Position: Prone     Implant:  None     C-Arm: Yes     Special Equipment: None     Neuromonitoring: None     Pre-op Testing: PER ANESTHESIA GUIDELINES     Clearance: OTHER:  PCP     Post op: 2 weeks post op     Home health: Keon Encarnacion MD  [facility]  Phone: [phone]  Fax: [phone]

## 2023-08-10 NOTE — H&P (VIEW-ONLY)
SURGERY SCHEDULING SHEET    Becky Ceballos  7/16/1990  [id number]    Procedure: Right L5-S1 microdiscectomy (81872)    Diagnosis:  Lumbar disc herniation     Anesthesia: General    Length of Surgery: 2 hrs    Disposition: Outpatient    Assist: Zac Paul    OR Table: Marcos Claire    Position: Prone    Implant:  None    C-Arm: Yes    Special Equipment: None    Neuromonitoring: None    Pre-op Testing: PER ANESTHESIA GUIDELINES    Clearance: OTHER:  PCP    Post op: 2 weeks post op    Home health: Keon Kendall MD  [address] [facility] Surgery  Phone: [phone]  Fax: [phone]

## 2023-08-11 ENCOUNTER — TELEPHONE (OUTPATIENT)
Dept: PAIN CLINIC | Facility: CLINIC | Age: 33
End: 2023-08-11

## 2023-08-11 NOTE — TELEPHONE ENCOUNTER
Order Questions    Question Answer   Anesthesia Type Sedation   Provider [facility]   Procedure Transforaminal   Laterality/Level RIGHT S1/2 TF-EVELINE   CPT (Hit enter after each entry) INJECTION, ANESTHETIC/STEROID, TRANSFORAMINAL EPIDURAL; LUMBAR/SACRAL, SINGLE LEVEL   Medical clearance requested (will send to Pain Navigator) No   Patient has Medicare coverage?  No

## 2023-08-11 NOTE — TELEPHONE ENCOUNTER
PATIENT NAME:  Aissatou Braswell [phone] (home)/ There is no work phone number on file. PATIENT :  1990  REFERRAL ID #:  [id number]  REFERRAL STATUS:  Authorized [1]  REVIEW REFERRAL NOTES FOR MORE INFORMATION:  DATE AUTHORIZED:  2023 // Lurdes Longo DATE: 2024   REFERRED BY:  Teena Cabezas MD (TEL: [phone])  REFERRED TO: No referral provider, MD (TEL: No Referred to Provider on Referral)     No vendor specified on referral. / No vendor phone number known.   No facility specified on referral  REFERRED FOR:    Diagnoses:    M51.26 (ICD-10-CM) - 722.10 (ICD-9-CM) - HNP (herniated nucleus pulposus), lumbar    M54.16 (ICD-10-CM) - 724.4 (ICD-9-CM) - Lumbar radiculopathy  Procedures:     [id number] - [facility] PAIN NAVIGATOR   NUMBER OF VISITS AUTH: 1

## 2023-08-13 DIAGNOSIS — M54.16 LUMBAR RADICULOPATHY: ICD-10-CM

## 2023-08-13 DIAGNOSIS — R29.898 RIGHT LEG WEAKNESS: ICD-10-CM

## 2023-08-14 RX ORDER — HYDROCODONE BITARTRATE AND ACETAMINOPHEN 7.5; 325 MG/1; MG/1
1 TABLET ORAL EVERY 8 HOURS PRN
Qty: 24 TABLET | Refills: 0 | Status: SHIPPED | OUTPATIENT
Start: 2023-08-14

## 2023-08-14 NOTE — TELEPHONE ENCOUNTER
.A refill request was received for:  Requested Prescriptions     Pending Prescriptions Disp Refills    HYDROcodone-acetaminophen (NORCO) 7.5-325 MG Oral Tab 24 tablet 0     Sig: Take 1 tablet by mouth every 8 (eight) hours as needed for Pain.        Last refill date:   7/28/2023    Last office visit: 8/7/2023    Follow up due:  Future Appointments   Date Time Provider Benedicto Mcneal   8/23/2023 11:30 AM Lakesha Macias MD [facility] [address]   9/22/2023  7:00 AM RO Lizama [facility]

## 2023-08-15 NOTE — TELEPHONE ENCOUNTER
Spoke with patient who mentioned that he does not want to move forward with scheduling injection as he is currently scheduled for surgery on 09/06/23 with .

## 2023-08-23 ENCOUNTER — LAB ENCOUNTER (OUTPATIENT)
Dept: LAB | Age: 33
End: 2023-08-23
Attending: STUDENT IN AN ORGANIZED HEALTH CARE EDUCATION/TRAINING PROGRAM
Payer: COMMERCIAL

## 2023-08-23 ENCOUNTER — OFFICE VISIT (OUTPATIENT)
Dept: FAMILY MEDICINE CLINIC | Facility: CLINIC | Age: 33
End: 2023-08-23
Payer: COMMERCIAL

## 2023-08-23 VITALS
DIASTOLIC BLOOD PRESSURE: 86 MMHG | RESPIRATION RATE: 16 BRPM | OXYGEN SATURATION: 98 % | HEART RATE: 68 BPM | BODY MASS INDEX: 26.46 KG/M2 | HEIGHT: 71 IN | WEIGHT: 189 LBS | SYSTOLIC BLOOD PRESSURE: 110 MMHG

## 2023-08-23 DIAGNOSIS — M51.26 LUMBAR DISC HERNIATION: ICD-10-CM

## 2023-08-23 DIAGNOSIS — Z01.818 PREOP EXAMINATION: Primary | ICD-10-CM

## 2023-08-23 DIAGNOSIS — M54.16 LUMBAR RADICULOPATHY: ICD-10-CM

## 2023-08-23 DIAGNOSIS — R29.898 RIGHT LEG WEAKNESS: ICD-10-CM

## 2023-08-23 LAB
ANTIBODY SCREEN: NEGATIVE
APTT PPP: 29.3 SECONDS (ref 23.3–35.6)
INR BLD: 1.04 (ref 0.85–1.16)
PROTHROMBIN TIME: 13.6 SECONDS (ref 11.6–14.8)
RH BLOOD TYPE: POSITIVE

## 2023-08-23 PROCEDURE — 86901 BLOOD TYPING SEROLOGIC RH(D): CPT

## 2023-08-23 PROCEDURE — 3074F SYST BP LT 130 MM HG: CPT | Performed by: FAMILY MEDICINE

## 2023-08-23 PROCEDURE — 86900 BLOOD TYPING SEROLOGIC ABO: CPT

## 2023-08-23 PROCEDURE — 85610 PROTHROMBIN TIME: CPT

## 2023-08-23 PROCEDURE — 87081 CULTURE SCREEN ONLY: CPT

## 2023-08-23 PROCEDURE — 36415 COLL VENOUS BLD VENIPUNCTURE: CPT

## 2023-08-23 PROCEDURE — 86850 RBC ANTIBODY SCREEN: CPT

## 2023-08-23 PROCEDURE — 3079F DIAST BP 80-89 MM HG: CPT | Performed by: FAMILY MEDICINE

## 2023-08-23 PROCEDURE — 3008F BODY MASS INDEX DOCD: CPT | Performed by: FAMILY MEDICINE

## 2023-08-23 PROCEDURE — 99213 OFFICE O/P EST LOW 20 MIN: CPT | Performed by: FAMILY MEDICINE

## 2023-08-23 PROCEDURE — 85730 THROMBOPLASTIN TIME PARTIAL: CPT

## 2023-08-23 RX ORDER — HYDROCODONE BITARTRATE AND ACETAMINOPHEN 7.5; 325 MG/1; MG/1
1 TABLET ORAL EVERY 8 HOURS PRN
Qty: 24 TABLET | Refills: 0 | Status: SHIPPED | OUTPATIENT
Start: 2023-08-23

## 2023-09-06 ENCOUNTER — ANESTHESIA EVENT (OUTPATIENT)
Dept: SURGERY | Facility: HOSPITAL | Age: 33
End: 2023-09-06
Payer: COMMERCIAL

## 2023-09-06 ENCOUNTER — HOSPITAL ENCOUNTER (OUTPATIENT)
Facility: HOSPITAL | Age: 33
Setting detail: HOSPITAL OUTPATIENT SURGERY
Discharge: HOME HEALTH | End: 2023-09-06
Attending: STUDENT IN AN ORGANIZED HEALTH CARE EDUCATION/TRAINING PROGRAM | Admitting: STUDENT IN AN ORGANIZED HEALTH CARE EDUCATION/TRAINING PROGRAM
Payer: COMMERCIAL

## 2023-09-06 ENCOUNTER — ANESTHESIA (OUTPATIENT)
Dept: SURGERY | Facility: HOSPITAL | Age: 33
End: 2023-09-06
Payer: COMMERCIAL

## 2023-09-06 ENCOUNTER — APPOINTMENT (OUTPATIENT)
Dept: GENERAL RADIOLOGY | Facility: HOSPITAL | Age: 33
End: 2023-09-06
Attending: STUDENT IN AN ORGANIZED HEALTH CARE EDUCATION/TRAINING PROGRAM
Payer: COMMERCIAL

## 2023-09-06 VITALS
RESPIRATION RATE: 15 BRPM | HEIGHT: 71 IN | DIASTOLIC BLOOD PRESSURE: 66 MMHG | SYSTOLIC BLOOD PRESSURE: 113 MMHG | TEMPERATURE: 98 F | OXYGEN SATURATION: 98 % | HEART RATE: 72 BPM | WEIGHT: 190 LBS | BODY MASS INDEX: 26.6 KG/M2

## 2023-09-06 DIAGNOSIS — M51.26 LUMBAR DISC HERNIATION: Primary | ICD-10-CM

## 2023-09-06 PROCEDURE — 0SB44ZZ EXCISION OF LUMBOSACRAL DISC, PERCUTANEOUS ENDOSCOPIC APPROACH: ICD-10-PCS | Performed by: STUDENT IN AN ORGANIZED HEALTH CARE EDUCATION/TRAINING PROGRAM

## 2023-09-06 PROCEDURE — 76000 FLUOROSCOPY <1 HR PHYS/QHP: CPT | Performed by: STUDENT IN AN ORGANIZED HEALTH CARE EDUCATION/TRAINING PROGRAM

## 2023-09-06 RX ORDER — BUPIVACAINE HYDROCHLORIDE AND EPINEPHRINE 2.5; 5 MG/ML; UG/ML
INJECTION, SOLUTION EPIDURAL; INFILTRATION; INTRACAUDAL; PERINEURAL AS NEEDED
Status: DISCONTINUED | OUTPATIENT
Start: 2023-09-06 | End: 2023-09-06 | Stop reason: HOSPADM

## 2023-09-06 RX ORDER — DIAZEPAM 5 MG/1
5 TABLET ORAL EVERY 6 HOURS PRN
Qty: 20 TABLET | Refills: 0 | Status: SHIPPED | OUTPATIENT
Start: 2023-09-06

## 2023-09-06 RX ORDER — HYDROMORPHONE HYDROCHLORIDE 1 MG/ML
INJECTION, SOLUTION INTRAMUSCULAR; INTRAVENOUS; SUBCUTANEOUS
Status: COMPLETED
Start: 2023-09-06 | End: 2023-09-06

## 2023-09-06 RX ORDER — OXYCODONE HYDROCHLORIDE 5 MG/1
5 TABLET ORAL EVERY 4 HOURS PRN
Qty: 20 TABLET | Refills: 0 | Status: SHIPPED | OUTPATIENT
Start: 2023-09-06

## 2023-09-06 RX ORDER — ONDANSETRON 2 MG/ML
INJECTION INTRAMUSCULAR; INTRAVENOUS AS NEEDED
Status: DISCONTINUED | OUTPATIENT
Start: 2023-09-06 | End: 2023-09-06 | Stop reason: SURG

## 2023-09-06 RX ORDER — HYDROCODONE BITARTRATE AND ACETAMINOPHEN 5; 325 MG/1; MG/1
1 TABLET ORAL ONCE AS NEEDED
Status: COMPLETED | OUTPATIENT
Start: 2023-09-06 | End: 2023-09-06

## 2023-09-06 RX ORDER — NALOXONE HYDROCHLORIDE 0.4 MG/ML
80 INJECTION, SOLUTION INTRAMUSCULAR; INTRAVENOUS; SUBCUTANEOUS AS NEEDED
Status: DISCONTINUED | OUTPATIENT
Start: 2023-09-06 | End: 2023-09-06

## 2023-09-06 RX ORDER — MIDAZOLAM HYDROCHLORIDE 1 MG/ML
INJECTION INTRAMUSCULAR; INTRAVENOUS AS NEEDED
Status: DISCONTINUED | OUTPATIENT
Start: 2023-09-06 | End: 2023-09-06 | Stop reason: SURG

## 2023-09-06 RX ORDER — HYDROMORPHONE HYDROCHLORIDE 1 MG/ML
0.4 INJECTION, SOLUTION INTRAMUSCULAR; INTRAVENOUS; SUBCUTANEOUS EVERY 5 MIN PRN
Status: DISCONTINUED | OUTPATIENT
Start: 2023-09-06 | End: 2023-09-06

## 2023-09-06 RX ORDER — GLYCOPYRROLATE 0.2 MG/ML
INJECTION, SOLUTION INTRAMUSCULAR; INTRAVENOUS AS NEEDED
Status: DISCONTINUED | OUTPATIENT
Start: 2023-09-06 | End: 2023-09-06 | Stop reason: SURG

## 2023-09-06 RX ORDER — LABETALOL HYDROCHLORIDE 5 MG/ML
5 INJECTION, SOLUTION INTRAVENOUS EVERY 5 MIN PRN
Status: DISCONTINUED | OUTPATIENT
Start: 2023-09-06 | End: 2023-09-06

## 2023-09-06 RX ORDER — ROCURONIUM BROMIDE 10 MG/ML
INJECTION, SOLUTION INTRAVENOUS AS NEEDED
Status: DISCONTINUED | OUTPATIENT
Start: 2023-09-06 | End: 2023-09-06 | Stop reason: SURG

## 2023-09-06 RX ORDER — ACETAMINOPHEN 500 MG
1000 TABLET ORAL ONCE AS NEEDED
Status: COMPLETED | OUTPATIENT
Start: 2023-09-06 | End: 2023-09-06

## 2023-09-06 RX ORDER — SODIUM CHLORIDE, SODIUM LACTATE, POTASSIUM CHLORIDE, CALCIUM CHLORIDE 600; 310; 30; 20 MG/100ML; MG/100ML; MG/100ML; MG/100ML
INJECTION, SOLUTION INTRAVENOUS CONTINUOUS
Status: DISCONTINUED | OUTPATIENT
Start: 2023-09-06 | End: 2023-09-06

## 2023-09-06 RX ORDER — HYDROMORPHONE HYDROCHLORIDE 1 MG/ML
0.6 INJECTION, SOLUTION INTRAMUSCULAR; INTRAVENOUS; SUBCUTANEOUS EVERY 5 MIN PRN
Status: DISCONTINUED | OUTPATIENT
Start: 2023-09-06 | End: 2023-09-06

## 2023-09-06 RX ORDER — LIDOCAINE HYDROCHLORIDE 10 MG/ML
INJECTION, SOLUTION EPIDURAL; INFILTRATION; INTRACAUDAL; PERINEURAL AS NEEDED
Status: DISCONTINUED | OUTPATIENT
Start: 2023-09-06 | End: 2023-09-06 | Stop reason: SURG

## 2023-09-06 RX ORDER — MEPERIDINE HYDROCHLORIDE 25 MG/ML
12.5 INJECTION INTRAMUSCULAR; INTRAVENOUS; SUBCUTANEOUS AS NEEDED
Status: DISCONTINUED | OUTPATIENT
Start: 2023-09-06 | End: 2023-09-06

## 2023-09-06 RX ORDER — SENNA AND DOCUSATE SODIUM 50; 8.6 MG/1; MG/1
1 TABLET, FILM COATED ORAL DAILY
Qty: 30 TABLET | Refills: 0 | Status: SHIPPED | OUTPATIENT
Start: 2023-09-06

## 2023-09-06 RX ORDER — ACETAMINOPHEN 500 MG
1000 TABLET ORAL EVERY 8 HOURS
Qty: 60 TABLET | Refills: 0 | Status: SHIPPED | OUTPATIENT
Start: 2023-09-06

## 2023-09-06 RX ORDER — HYDROCODONE BITARTRATE AND ACETAMINOPHEN 5; 325 MG/1; MG/1
2 TABLET ORAL ONCE AS NEEDED
Status: COMPLETED | OUTPATIENT
Start: 2023-09-06 | End: 2023-09-06

## 2023-09-06 RX ORDER — CEFAZOLIN SODIUM/WATER 2 G/20 ML
2 SYRINGE (ML) INTRAVENOUS ONCE
Status: COMPLETED | OUTPATIENT
Start: 2023-09-06 | End: 2023-09-06

## 2023-09-06 RX ORDER — PROCHLORPERAZINE EDISYLATE 5 MG/ML
5 INJECTION INTRAMUSCULAR; INTRAVENOUS EVERY 8 HOURS PRN
Status: DISCONTINUED | OUTPATIENT
Start: 2023-09-06 | End: 2023-09-06

## 2023-09-06 RX ORDER — NEOSTIGMINE METHYLSULFATE 1 MG/ML
INJECTION, SOLUTION INTRAVENOUS AS NEEDED
Status: DISCONTINUED | OUTPATIENT
Start: 2023-09-06 | End: 2023-09-06 | Stop reason: SURG

## 2023-09-06 RX ORDER — SCOLOPAMINE TRANSDERMAL SYSTEM 1 MG/1
1 PATCH, EXTENDED RELEASE TRANSDERMAL ONCE
Status: DISCONTINUED | OUTPATIENT
Start: 2023-09-06 | End: 2023-09-06 | Stop reason: HOSPADM

## 2023-09-06 RX ORDER — ACETAMINOPHEN 500 MG
1000 TABLET ORAL ONCE
Status: DISCONTINUED | OUTPATIENT
Start: 2023-09-06 | End: 2023-09-06 | Stop reason: HOSPADM

## 2023-09-06 RX ORDER — HYDROMORPHONE HYDROCHLORIDE 1 MG/ML
0.2 INJECTION, SOLUTION INTRAMUSCULAR; INTRAVENOUS; SUBCUTANEOUS EVERY 5 MIN PRN
Status: DISCONTINUED | OUTPATIENT
Start: 2023-09-06 | End: 2023-09-06

## 2023-09-06 RX ORDER — ONDANSETRON 2 MG/ML
4 INJECTION INTRAMUSCULAR; INTRAVENOUS EVERY 6 HOURS PRN
Status: DISCONTINUED | OUTPATIENT
Start: 2023-09-06 | End: 2023-09-06

## 2023-09-06 RX ORDER — DEXAMETHASONE SODIUM PHOSPHATE 4 MG/ML
VIAL (ML) INJECTION AS NEEDED
Status: DISCONTINUED | OUTPATIENT
Start: 2023-09-06 | End: 2023-09-06 | Stop reason: SURG

## 2023-09-06 RX ORDER — DIPHENHYDRAMINE HYDROCHLORIDE 50 MG/ML
12.5 INJECTION INTRAMUSCULAR; INTRAVENOUS AS NEEDED
Status: DISCONTINUED | OUTPATIENT
Start: 2023-09-06 | End: 2023-09-06

## 2023-09-06 RX ORDER — CEFAZOLIN SODIUM/WATER 2 G/20 ML
SYRINGE (ML) INTRAVENOUS
Status: DISCONTINUED
Start: 2023-09-06 | End: 2023-09-06

## 2023-09-06 RX ORDER — PHENYLEPHRINE HCL 10 MG/ML
VIAL (ML) INJECTION AS NEEDED
Status: DISCONTINUED | OUTPATIENT
Start: 2023-09-06 | End: 2023-09-06 | Stop reason: SURG

## 2023-09-06 RX ADMIN — PHENYLEPHRINE HCL 100 MCG: 10 MG/ML VIAL (ML) INJECTION at 11:43:00

## 2023-09-06 RX ADMIN — CEFAZOLIN SODIUM/WATER 2 G: 2 G/20 ML SYRINGE (ML) INTRAVENOUS at 10:10:00

## 2023-09-06 RX ADMIN — NEOSTIGMINE METHYLSULFATE 5 MG: 1 INJECTION, SOLUTION INTRAVENOUS at 11:52:00

## 2023-09-06 RX ADMIN — SODIUM CHLORIDE, SODIUM LACTATE, POTASSIUM CHLORIDE, CALCIUM CHLORIDE: 600; 310; 30; 20 INJECTION, SOLUTION INTRAVENOUS at 10:34:00

## 2023-09-06 RX ADMIN — GLYCOPYRROLATE 0.8 MG: 0.2 INJECTION, SOLUTION INTRAMUSCULAR; INTRAVENOUS at 11:52:00

## 2023-09-06 RX ADMIN — SODIUM CHLORIDE, SODIUM LACTATE, POTASSIUM CHLORIDE, CALCIUM CHLORIDE: 600; 310; 30; 20 INJECTION, SOLUTION INTRAVENOUS at 09:59:00

## 2023-09-06 RX ADMIN — DEXAMETHASONE SODIUM PHOSPHATE 8 MG: 4 MG/ML VIAL (ML) INJECTION at 10:11:00

## 2023-09-06 RX ADMIN — LIDOCAINE HYDROCHLORIDE 100 MG: 10 INJECTION, SOLUTION EPIDURAL; INFILTRATION; INTRACAUDAL; PERINEURAL at 10:04:00

## 2023-09-06 RX ADMIN — SODIUM CHLORIDE, SODIUM LACTATE, POTASSIUM CHLORIDE, CALCIUM CHLORIDE: 600; 310; 30; 20 INJECTION, SOLUTION INTRAVENOUS at 11:55:00

## 2023-09-06 RX ADMIN — MIDAZOLAM HYDROCHLORIDE 2 MG: 1 INJECTION INTRAMUSCULAR; INTRAVENOUS at 09:59:00

## 2023-09-06 RX ADMIN — SODIUM CHLORIDE, SODIUM LACTATE, POTASSIUM CHLORIDE, CALCIUM CHLORIDE: 600; 310; 30; 20 INJECTION, SOLUTION INTRAVENOUS at 11:18:00

## 2023-09-06 RX ADMIN — ONDANSETRON 4 MG: 2 INJECTION INTRAMUSCULAR; INTRAVENOUS at 11:34:00

## 2023-09-06 RX ADMIN — PHENYLEPHRINE HCL 100 MCG: 10 MG/ML VIAL (ML) INJECTION at 11:34:00

## 2023-09-06 RX ADMIN — ROCURONIUM BROMIDE 50 MG: 10 INJECTION, SOLUTION INTRAVENOUS at 10:04:00

## 2023-09-06 NOTE — CM/SW NOTE
Pt is s/p s/p Right L5-S1 Microdiscectomy . Pre-op plan [facility].     Jaskaran Code, LCSW  /Discharge Planner

## 2023-09-06 NOTE — DISCHARGE INSTRUCTIONS
Sometimes managing your health at home requires assistance. The [facility] team has recognized your preference to use [facility]. They can be reached by phone at [phone]. The fax number for your reference is [phone]. A representative from the home health agency will contact you or your family to schedule your first visit. Spine Surgery Postoperative Instructions    Wound / Dressing Care:    Before changing your dressing, wash your hands (or ask your helper to wash their hands for 20 seconds). Do not apply creams, solutions, or ointments to the incision. You may remove the dressing 3 days after surgery, if there is no further drainage, and shower. If there is drainage, cover the wound with new dressing and wait until drainage is no longer present. Apply sterile dressing to wound until 7 days after surgery, then you may leave wound open to air if there is no drainage. Check the incision for increased warmth, redness, swelling, unexplained increase in pain, change in the drainage, or persistent drainage that is not decreasing. Call Dr. Ginger Crain office [phone] if there are changes or concerns. If you have Steri strips, please leave them on until they are loose and almost falling off. In this case, you may then gently lift off the Steri strips. Showering:   Do no apply water direct over the wound. It's ok to let water run over the incision. Pat the incision dry with a clean towel and apply new dressing if less than 7 days. Do not bath, swim, or soak in water until cleared by your surgeon. Medications:  Please resume your pre-hospital medications unless otherwise instructed. If you usually take blood thinners, you will be given instructions about when to resume them. Please inform your primary care physician about your admission to the hospital and if there has been a change in your usual medications, while you were hospitalized. Managing pain:  You may have some ups and downs with your pain. You may be encouraged if you notice the gradual improvement in the pain as the days go by. You may be able to take the edge off the pain but not stop all your pain, however. Pain is part of the healing process after injury and surgery. Please follow the plan below to help control your pain and reduce the amount of narcotics you require. - Take 1000 mg of Acetaminophen (Tylenol) three times a day scheduled for first 5 days. Do not exceed more than 3,000 mg in a 24 hour period or mix with other medications that contain acetaminophen. Take Tylenol as needed if no longer requiring supplemental narcotics  - Supplement with Oxycodone 5-10mg every 4 hours as needed  - For muscle spasm type pain take muscle relaxant every 8 hours as needed    As your pain improves, please decrease the amount of pain medication (by taking fewer tablets and/or increasing the time between doses). Do not increase the dose once you have dropped down to a lesser amount. Hold your pain medication if you experience over sedation (sleeping too much), slurred speech, slow breathing, or hallucinations. If these symptoms occur, you will need to get advice on how and when it is safe to resume your pain medication. Please call for advice. Please do not drink alcohol, drive, or operate heavy machinery while taking your pain or anti spasm medication. For surgery related pain medication refills, if needed, please call the spine clinic directly [phone]  (please leave a message for call back) or your usual pain prescribing clinician. Constipation:   Pain medication often causes constipation. Try standing and moving for 1 minute each hour and work up to walking 30 minutes a day.     Drink fluids (32-64 ounces every day) unless patient has cardiac history    Minimize narcotic use    Use natural laxatives such as prune juice, but avoid coffee or caffeinated products that may disturb sleep cycle    Have high fiber diet, have smaller meals throughout the day    Take Miralax mixed with water or juice BID and Senna-S nightly until regular BM. Hold for loose, frequent, or water stools    If no BM, take Senna-s twice a day       Smoking:   Failure of fusion is as high as 65% in smokers and nicotine users. Therefore, spine patients should not smoke or use nicotine for 6 months after surgery. This is your time to quit. Activity:   Walking is encouraged. Avoid heavy lifting (no more than 10 pounds). Do bend at the waist to lift anything. Avoid extreme twisting. You may not drive a car until cleared to do so by the spine team. Please wear a seat belt. Sexual Activity:   After 2 weeks, when comfortable and approved by your surgeon. Stop if causing pain. When should you call the office: Call if  You have increased drainage and/or odor from you wound. You have increased redness/swelling at the incision site or unexplained incisional pain. You have a fever of greater than 101 degrees that lasts for several hours. Keep in mind that elevated temperature is common within the first several days after surgery. If available, take Tylenol and do deep breathing and coughing to reduce the temperature. If the fever persists after 5 days from surgery, then contact your surgeon. You have new or unfamiliar pain or weakness in your arms or legs. You have loss of control of urination or bowel movements, pain or numbness in the rectal, vaginal, or scrotal area. Constipation is very common especially when taking pain medications. If stool softeners, laxatives, and other treatments do not work, contact your PCP or surgeon. You have new tenderness in your calf, redness or discoloration of the leg, new shortness of breath, cough up blood, or have chest pain. These may be signs of a blood clot.      For life threatening emergency including difficulty breathing or chest pain, please call 911. Follow-up: If you have questions regarding your appointment or if an appointment has not been made, please speak with your surgeon's staff [phone].

## 2023-09-06 NOTE — ANESTHESIA PROCEDURE NOTES
Airway  Date/Time: 9/6/2023 10:04 AM  Urgency: elective    Airway not difficult    General Information and Staff    Patient location during procedure: OR  Anesthesiologist: Mando Keith MD  Performed: anesthesiologist   Performed by: Mando Keith MD  Authorized by: Mando Keith MD      Indications and Patient Condition  Indications for airway management: anesthesia  Spontaneous Ventilation: absent  Sedation level: deep  Preoxygenated: yes  Patient position: sniffing  Mask difficulty assessment: 1 - vent by mask    Final Airway Details  Final airway type: endotracheal airway      Successful airway: ETT  Cuffed: yes   Successful intubation technique: direct laryngoscopy  Endotracheal tube insertion site: oral  Blade: Arcadio  Blade size: #3  ETT size (mm): 7.5    Cormack-Lehane Classification: grade I - full view of glottis  Placement verified by: capnometry   Cuff volume (mL): 11  Measured from: lips  ETT to lips (cm): 24  Number of attempts at approach: 1  Number of other approaches attempted: 0

## 2023-09-06 NOTE — BRIEF OP NOTE
Pre-Operative Diagnosis: Lumbar disc herniation [M51.26]     Post-Operative Diagnosis: Lumbar disc herniation [M51.26]      Procedure Performed:   Lumbar 5 - Sacrum 1 MICRODISCECTOMY    Surgeon(s) and Role:     * Aylin Dhaliwal MD - Primary    Assistant(s):  APN: RO Gore     Surgical Findings: Right paracentral disc herniation at L5-S1     Specimen: None     Estimated Blood Loss: Blood Output: 10 mL (9/6/2023 11:37 AM)      Plan    Antibiotics:  preop  Anticoagulation: SCDs and early ambulation  Drain: None  Activity:  As tolerated  Brace: None  Medical history, allergies, and medications: In EPIC  Pain control: Routine postop  Anticipated discharge: When pain control and activity tolerance allow.  Should be  PACU    Romario Wayne MD  9/6/2023  11:55 AM

## 2023-09-06 NOTE — INTERVAL H&P NOTE
Pre-op Diagnosis: Lumbar disc herniation [M51.26]    The above referenced H&P was reviewed by Ekaterina Marina MD on 9/6/2023, the patient was examined and no significant changes have occurred in the patient's condition since the H&P was performed. I discussed with the patient and/or legal representative the potential benefits, risks and side effects of this procedure; the likelihood of the patient achieving goals; and potential problems that might occur during recuperation. I discussed reasonable alternatives to the procedure, including risks, benefits and side effects related to the alternatives and risks related to not receiving this procedure. We will proceed with procedure as planned.

## 2023-09-06 NOTE — OPERATIVE REPORT
SURGEON: Abel Pulliam MD    DATE OF SURGERY: 9/6/2023    PREOPERATIVE DIAGNOSIS:   L5-S1 lumbar disc herniation    POSTOPERATIVE DIAGNOSIS:   L5-S1 lumbar disc herniation    NAME OF OPERATION:  1. Right L5-S1 laminotomy and microdiscectomy (29127)    ANESTHESIA: General endotracheal.     ESTIMATED BLOOD LOSS: 10 mL. DISPOSITION: Stable, extubated to PACU. INDICATIONS: This patient is a 35year old-year-old male with a long-standing history of right S1 radiculopathy and has failed non operative management. Imaging demonstrated right paracentral disc herniation at L5-S1. After discussing the risks and benefits of surgery and of non operative treatment, the patient elected for the above procedure. Please see clinic notes for details. Prior to surgery I explained in detail the risks of surgery including: risk of nerve injury, persistent and or worsening pain, spinal fluid leak, infection or meningitis, excessive bleeding, paralysis, death, blindness, sexual dysfunction, blood vessel injury, injury to neighboring organs, need for further surgery, bowel and bladder dysfunction, spinal instability, and autonomic nervous system dysfunction as well as unforeseen medical and surgical complications. An understanding that in general spinal surgery is more predictive in improving extremity discomfort than axial spine pain was stressed. DESCRIPTION OF PROCEDURE: The patient was identified in the preoperative holding area. The site of surgery and consent verified with the patient. Patient was then brought to the Operating Room. General anesthesia was administered. The patient was intubated without difficulty. NOE and SCD stockings were placed for mechanical DVT prophylaxis. He was then positioned prone onto a Saad table with bony prominences well padded. Abdomen was hanging free. His back was prepped and draped in the usual sterile standard fashion.  A hard stop surgical timeout performed with all members of the Operating Room staff, and IV Ancef given prior to surgical start. The incision was localized with a lateral fluoroscopic image. The lumbar spine was then prepped and draped in the standard sterile fashion. An incision was made in the skin over the intended surgical levels and dissection was carried down through the subcutaneous tissue down to the level of the deep fascia. The deep fascia was elevated off the posterior elements in a subperiosteal manner. An intra-operative fluoroscopic image was taken to confirm the appropriate spinal level. At this time a Ocampo retractor was placed and the operating microscope was draped and brought into the field. Laminotomy was performed using high speed mark. A curette was used to remove, in a subperiosteal manner, the attachment of the ligamentum flavum from the undersurface of the superior lamina. The ligamentum flavum was then dissected free of its attachment to the pars interarticularis, the medial articular facet and the inferior lamina. Lateral dura and the traversing nerve root were exposed; S1 pedicle and neural foramen was palpated. After obtaining meticulous hemostasis using a bipolar cautery, the shoulder of the traversing nerve root was mobilized and the disk space was exposed. A nerve root retractor was placed lateral to the traversing nerve root to protect the neural structures. A bipolar was used for hemostasis and was used on top of the disc space as further confirmation there were no neural structures over the disc space. A 15-blade on a long handle was used to make an annulotomy in line with the nerve root. Palpation around the annulotomy with a down-going curette led to extrusion of the disc material. A micropituitary was used to grab and remove herniated fragment in one large piece and other smaller extruded fragments.  A nerve hook was used in the disc space to free up any unstable disc material. A micropituitary was used to remove small fragments of disc. A Penfield 4 and Orbie Keepers was then used to palpate ventral to the dura medially, distally, proximally and laterally and all loose disk fragments were removed. The disk space and floor of the canal were flushed with saline to free up loose fragments. At this time a foraminotomy was performed at the right S1 foramen to ensure complete decompression of the nerve root. The nerve root and canal were then checked again with a Phillips probe. Being convinced that the nerve root was completely free and mobile, the wound was copiously irrigated and meticulous hemostasis was obtained. The retractors were removed and the side walls were again inspected to ensure no bleeding vessels remained. Valsava maneuver was performed and no extravasation of CSF was detected. The fascial layer was closed with 0 vicryl sutures in interrupted, figure-of-8 fashion. The subdermal layer was closed with 2-0 vicryl suture and the skin was closed with a buried subcuticular stitch. Dermabond was placed over the incision. The patient was then carefully placed in the supine position and extubated. I attest I was present for and performed all key and critical aspects of the procedure.

## 2023-09-06 NOTE — CM/SW NOTE
09/06/23 1600   Discharge disposition   Expected discharge disposition Home-Health   Post Acute Care Provider [facility]

## 2023-09-08 ENCOUNTER — TELEPHONE (OUTPATIENT)
Dept: ORTHOPEDICS CLINIC | Facility: CLINIC | Age: 33
End: 2023-09-08

## 2023-09-08 NOTE — TELEPHONE ENCOUNTER
POST OP: 9/6/2023 Right L5-S1 BRIAN     Called patient post op. Patient with complaint of pain 5/10 incision site area. Right leg with numb sensation in the toes, same as prior to surgery. Denies leg pain. Denies any new numbness, tingling or weakness in lower extremities/upper extremities. Incision: Patient states that dressing dry and intact. No concerns at this time. Plans to change the dressing tomorrow after a shower. Taking pain medications: Taking oxycodone and tylenol. Denies nausea/ vomiting / fevers. No BM since surgery, is using senna at bedtime as prescribed. Reviewed to add miralax OTC next if continues with no BM after today, and call if any difficulty with BM. Encouraged 4 short walks in the home daily, increase as tolerated. Patient encouraged to increase fluids and fiber intake. + flatus  Denies any incontinence of urine or bowel. Denies urinary concerns  Denies SOB/CP/Calf pain      Patient states that they are up walking. Continue spine precautions. Encouraged patient to call with any questions or concerns.     Has post op follow up scheduled:     Future Appointments   Date Time Provider Benedicto Mcneal   9/22/2023  7:00 AM RO Garcia [facility]

## 2023-09-13 ENCOUNTER — TELEPHONE (OUTPATIENT)
Dept: ORTHOPEDICS CLINIC | Facility: CLINIC | Age: 33
End: 2023-09-13

## 2023-09-18 ENCOUNTER — TELEPHONE (OUTPATIENT)
Dept: ORTHOPEDICS CLINIC | Facility: CLINIC | Age: 33
End: 2023-09-18

## 2023-09-21 NOTE — PROGRESS NOTES
Post operative follow up    CC: Patient presents with:  Post-Op: 2 weeks sx 9/6/23 L5-S1 microdisc    HPI:   Stephen Jung is a 35year old male with chief complaint of Patient presents with:  Post-Op: 2 weeks sx 9/6/23 L5-S1 microdisc    Post op: 9/6/2023 Right L5-S1 BRIAN     Denies current pain, with some discomfort in the right lateral posterior lower extremity with numbness that was present prior to surgery this extends to the right lateral/sole of the foot to digits 4 and 5. Radicular pain continues to be resolved post operative. Does continue with weakness in the right calf with single leg raise. Severity is 1/10. Frequency of symptoms is daily. Duration is numbness pre and post operative. Quality numbness. Improved by unknown and worsened by nothing. Back to work: desk job    Incision: no concerns   Taking: Discontinued all pain medication last week. VAS: 1/10 with pain \"discomfort\" in the incision site area. Denies CHEST PAIN/SOB/calf pain. Denies fever/chills/nausea/vomiting. Old records, summarized above in HPI, which were reviewed:  Discharged from [facility] on 9/18/2023    Current Medications:  Current Outpatient Medications   Medication Sig Dispense Refill    COLLAGEN OR Take by mouth daily. HISTORY:  Past Medical History:   Diagnosis Date    COVID-19 08/2021    body aches, loss of taste and smell. No hospitalization      Past Surgical History:   Procedure Laterality Date    BACK SURGERY  09/06/2023    L5-S1 microdsicectomy    OTHER SURGICAL HISTORY        Family History   Problem Relation Age of Onset    Heart Disease Paternal Grandfather       Social History     Socioeconomic History    Marital status:    Tobacco Use    Smoking status: Never    Smokeless tobacco: Never   Vaping Use    Vaping Use: Never used   Substance and Sexual Activity    Alcohol use: Not Currently     Comment: ocassionally    Drug use: Yes     Comment: CBD oil        Exam     A&Ox4 in no acute distress. Seated on exam room chair. Non antalgic gait, able to normal heel and toe walk. Incision is well approximated without drainage, swelling or erythema. Strength is 5/5 bilateral in the lower extremities, IP/quad/DF/PF/EHL. Single calf raise is to about 1-2 cm off the ground on the right able to raise fully on the left. SLR is negative bilaterally, bilateral hamstring tightness noted right worse than left. Sensation is intact to light touch to the bilateral lower extremities, except numbness of the lateral part of the right foot. Denies calf pain. No edema in the lower extremities. Dorsalis pedis pulses are intact bilaterally. IMAGING STUDIES:  No new imaging to review. CBC:  Lab Results   Component Value Date    WBC 8.2 07/29/2023    RBC 5.17 07/29/2023    HGB 14.9 07/29/2023    HCT 45.1 07/29/2023    .0 07/29/2023     basic metabolic panel  No results found for: \"A1C\"     Medical Decision Making:   Impression:   1. S/P lumbar microdiscectomy, right L5-S1  - OP REFERRAL TO [facility]    Plan:  We discussed typical post operative heating time. To continue with limited bending, limited twisting and no lift greater than 15# at this time. To increase activity under the supervision of physical therapy. We reviewed gentle nerve glide techniques. Okay for OTC scar creams and we reviewed need for sun screen on the scar site as well as continued monitoring. No submersion for one more week. Activity: continue limited bending, limited twisting and no lift greater than 15# at this time. Discontinue spine precautions at 6 weeks after surgery. Continue with frequent breaks while at work. Medications: OTC pain medications as needed  Physical therapy: Post operative recovery, scar mobilization, re sensitization, bilateral hamstring tightness, and right GSC weakness. Review treatment plan with the patient.   Return in about 4 weeks (around 10/20/2023), or if symptoms worsen or fail to improve. Patient educated and verbalized understanding. The patient indicates understanding of these issues and agrees to the plan.     Alonso Garcia, MIKE-BC, RNFA  Collaborative: Mary Rahman MD  Orthopedic Spine Surgeon  [facility]   [facility] [address] [facility], [facility], [address] [place] [address]   [facility] [address]in[address] Galarza [address]   t: [phone]   f: [phone]

## 2023-09-22 ENCOUNTER — OFFICE VISIT (OUTPATIENT)
Dept: ORTHOPEDICS CLINIC | Facility: CLINIC | Age: 33
End: 2023-09-22
Payer: COMMERCIAL

## 2023-09-22 VITALS — HEIGHT: 71 IN | WEIGHT: 190 LBS | BODY MASS INDEX: 26.6 KG/M2

## 2023-09-22 DIAGNOSIS — Z98.890 S/P LUMBAR MICRODISCECTOMY: Primary | ICD-10-CM

## 2023-09-22 PROBLEM — M54.41 ACUTE RIGHT-SIDED LOW BACK PAIN WITH RIGHT-SIDED SCIATICA: Status: RESOLVED | Noted: 2023-07-19 | Resolved: 2023-09-22

## 2023-09-22 PROCEDURE — 99024 POSTOP FOLLOW-UP VISIT: CPT | Performed by: NURSE PRACTITIONER

## 2023-09-22 PROCEDURE — 3008F BODY MASS INDEX DOCD: CPT | Performed by: NURSE PRACTITIONER

## 2023-10-10 ENCOUNTER — TELEPHONE (OUTPATIENT)
Dept: PHYSICAL THERAPY | Facility: HOSPITAL | Age: 33
End: 2023-10-10

## 2023-10-11 ENCOUNTER — OFFICE VISIT (OUTPATIENT)
Dept: PHYSICAL THERAPY | Facility: HOSPITAL | Age: 33
End: 2023-10-11
Attending: NURSE PRACTITIONER
Payer: COMMERCIAL

## 2023-10-11 DIAGNOSIS — Z98.890 S/P LUMBAR MICRODISCECTOMY: Primary | ICD-10-CM

## 2023-10-11 PROCEDURE — 97110 THERAPEUTIC EXERCISES: CPT

## 2023-10-11 PROCEDURE — 97162 PT EVAL MOD COMPLEX 30 MIN: CPT

## 2023-10-16 ENCOUNTER — OFFICE VISIT (OUTPATIENT)
Dept: PHYSICAL THERAPY | Facility: HOSPITAL | Age: 33
End: 2023-10-16
Attending: NURSE PRACTITIONER
Payer: COMMERCIAL

## 2023-10-16 PROCEDURE — 97140 MANUAL THERAPY 1/> REGIONS: CPT

## 2023-10-16 PROCEDURE — 97110 THERAPEUTIC EXERCISES: CPT

## 2023-10-16 NOTE — PROGRESS NOTES
Diagnosis:     S/P lumbar microdiscectomy (V75.928)    Referring Provider: Gaby Mae  Date of Evaluation:    10/11/2023    Precautions:   no lifting over 20 lbs s/p L5 microdisectomy on Sept 6, 2023. Next MD visit:   none scheduled  Date of Surgery: Sept 6, 2023. Insurance Primary/Secondary: [employer] HMO / N/A     # Auth Visits: 5 authorized per POC [employer] HMO            Subjective: Mat states he was sore for 2 days following eval, then felt better. He cont to have same hamstring symptoms, gets irritated with stretching. Pain: 2/10      Objective:   P1: posterior thigh  P2: back (not hurting currently, just with flexion)  P3: numbness is constant  Current functional limitations include: bending, lifting, sitting, longer car rides, exercise right now (as post op). Sleep is ok. AROM: (* denotes performed with pain)  Flexion: NT  Extension: min to mod limited (hinges higher at T11)  Sidebending: R min limited; L min limited (hinges higher near T11)  Rotation: R nil limited (tightness); L nil limited (tightness)      Assessment: Mat felt radicular pain with the first straight leg sciatic nerve glide c adductor bias, but the following reps felt food/like a stretch. Attempted a bit of multifidi ext prone over pillow, he did not feel pain with this, he wanted to try it at home, advised him to do so with caution. He tolerated all activities well without radicular pain, cont to be hypomobile in all lumbar segments. He is feeling pain at T10 with prone press up, likely as he tends to hinge here.        Goals:   (to be met in 10 visits)   Pt will improve transversus abdominis recruitment to perform proper isometric contraction without requiring verbal or tactile cuing to promote advancement of therex   Pt will demonstrate good understanding of proper posture and body mechanics to decrease pain and improve spinal safety   Pt will improve lumbar spine AROM ext to >10 deg to allow increase ease withstanding for 30 min at a time  Pt will report improved symptom centralization and absence of radicular symptoms for 3 consecutive days to improve function with ADL   Pt will improve SLR to 60 deg on the R to demo decreased neural tension. Pt will have decreased paraspinal mm tension to tolerate standing >40 minutes for work and home activities   Pt will demonstrate improved core strength to be able to perform 10 squats with <2/10 pain   Pt will be independent and compliant with comprehensive HEP to maintain progress achieved in PT     Plan: Progress lumbopelvic strengthening and decrease neural tension,  Date: 10/16/2023  TX#: 2/5 auth Date:                 TX#: 3/ Date:                 TX#: 4/ Date:                 TX#: 5/ Date:    Tx#: 6/   Therex:  10x R lumbar rot stretch to tolerance, 5\" hold  15x multifidi ext in prone with pillow under abs and chest raise (partial range)       Neuro re-ed:  12x SL sciatic nerve glides  with adductor bias, passive supine       2x10 prone hip ext   3x20\" passive R hamstring stretch        Manual:  Effluerage to lumbar paraspinals GR II-III UPA R L1-L4, GR II CPA L1-L4  Gentle distraction of hips  X15 min  Prone press up with therapist PA at T10 and L1, partial range       HEP: : 2x10 pelvic tilts, 15x TA brace, 2x30\" hamstring stretch, 15x sciatic n glides, then adductor bias glides with floss x10 min     Charges: manual:1, therex:2       Total Timed Treatment: 45 min  Total Treatment Time: 45 min

## 2023-10-19 ENCOUNTER — APPOINTMENT (OUTPATIENT)
Dept: PHYSICAL THERAPY | Age: 33
End: 2023-10-19
Attending: NURSE PRACTITIONER
Payer: COMMERCIAL

## 2023-10-19 ENCOUNTER — OFFICE VISIT (OUTPATIENT)
Dept: ORTHOPEDICS CLINIC | Facility: CLINIC | Age: 33
End: 2023-10-19
Payer: COMMERCIAL

## 2023-10-19 VITALS — WEIGHT: 190 LBS | HEIGHT: 71 IN | BODY MASS INDEX: 26.6 KG/M2

## 2023-10-19 DIAGNOSIS — Z98.890 STATUS POST SURGERY: Primary | ICD-10-CM

## 2023-10-19 PROCEDURE — 99024 POSTOP FOLLOW-UP VISIT: CPT | Performed by: STUDENT IN AN ORGANIZED HEALTH CARE EDUCATION/TRAINING PROGRAM

## 2023-10-19 PROCEDURE — 3008F BODY MASS INDEX DOCD: CPT | Performed by: STUDENT IN AN ORGANIZED HEALTH CARE EDUCATION/TRAINING PROGRAM

## 2023-10-19 NOTE — PROGRESS NOTES
6 weeks status post lumbar decompression     DOS: 9/6/23 R L5-S1 microdisectomy    Patient is now 6 weeks out from lumbar decompression and doing well. Improvement in preoperative leg pain and paresthesias is sustained. No pain medications. Minimal pain. Working w/ PT. Physical examination    Lumbar spine demonstrates a healed surgical incision. Bilateral lower extremity exam demonstrates 5 out of 5 strength in the quadriceps, tibialis anterior, EHL, and gastrocsoleus bilaterally. Sensation is intact to light touch from L2-S1. Negative nerve tension signs    Imaging    Assessment and Plan    6 weeks status post decompression doing well. Improvement in preoperative radiculitis and radiculopathy. Wound is healed.  -Continue outpatient lumbar physiotherapy  -Follow-up in 6 weeks for repeat clinical evaluation    Jj Mendenhall MD  Orthopedic Spine Surgeon  [facility]   [facility] [address] [facility], [facility], [address] [facility]   [address], Corinne [address] [place] [address]. Ginny Frost. [email or web address]  t: [id number]   f: [phone]        This note was dictated using Dragon software. While it was briefly proofread prior to completion, some grammatical, spelling, and word choice errors due to dictation may still occur.

## 2023-10-20 ENCOUNTER — OFFICE VISIT (OUTPATIENT)
Dept: PHYSICAL THERAPY | Facility: HOSPITAL | Age: 33
End: 2023-10-20
Attending: NURSE PRACTITIONER
Payer: COMMERCIAL

## 2023-10-20 PROCEDURE — 97112 NEUROMUSCULAR REEDUCATION: CPT

## 2023-10-20 PROCEDURE — 97140 MANUAL THERAPY 1/> REGIONS: CPT

## 2023-10-20 PROCEDURE — 97110 THERAPEUTIC EXERCISES: CPT

## 2023-10-20 NOTE — PROGRESS NOTES
Diagnosis:     S/P lumbar microdiscectomy (X19.232)    Referring Provider: Alena Gomez  Date of Evaluation:    10/11/2023    Precautions:   no lifting over 20 lbs s/p L5 microdisectomy on Sept 6, 2023. Next MD visit:   none scheduled  Date of Surgery: Sept 6, 2023. Insurance Primary/Secondary: [employer] HMO / N/A     # Auth Visits: 5 authorized per POC [facility] HMO            Subjective: Farhat states his symptoms are getting slightly better over time. Still having numbness and posterior thigh soreness, especially when sitting but getting less intense. Stretching/nerve gliding feels the most beneficial. Has started biking which feels good. Pain: 2/10      Objective:   P1: posterior thigh  P2: back (upper lumbar) (not hurting currently, just with flexion)  P3: numbness is constant (some paresthesia with leg elevated which feels like relief then goes back to numb)  Current functional limitations include: bending, lifting, sitting, longer car rides, exercise right now (as post op). Sleep is ok. AROM: (* denotes performed with pain)  Flexion: limited 75%  Extension: min limited (hinges higher at T11)  Sidebending: R min limited; L min limited (hinges higher near T11)  Rotation: R nil limited (tightness); L nil limited (tightness)    SLR: 42 pre; 45 post    Single leg heel raise: R 9; L 20      Assessment: Farhat has significant limitation in lumbar flexion actively. Passively he has a bit more. This is likely due to some guarding of the lumbar multifidi. Responded well to manual therapy and contract relax exercises into lumbar flexion. Also added some gentle lumbar flexion with seated neural glides at home. Added single leg heel raise to HEP.        Goals:   (to be met in 10 visits)   Pt will improve transversus abdominis recruitment to perform proper isometric contraction without requiring verbal or tactile cuing to promote advancement of therex   Pt will demonstrate good understanding of proper posture and body mechanics to decrease pain and improve spinal safety   Pt will improve lumbar spine AROM ext to >10 deg to allow increase ease withstanding for 30 min at a time  Pt will report improved symptom centralization and absence of radicular symptoms for 3 consecutive days to improve function with ADL   Pt will improve SLR to 60 deg on the R to demo decreased neural tension. Pt will have decreased paraspinal mm tension to tolerate standing >40 minutes for work and home activities   Pt will demonstrate improved core strength to be able to perform 10 squats with <2/10 pain   Pt will be independent and compliant with comprehensive HEP to maintain progress achieved in PT     Plan: Progress lumbopelvic strengthening and decrease neural tension,  Date: 10/16/2023  TX#: 2/5 auth Date:   10/20/23              TX#: 3/5 Date:                 TX#: 4/ Date:                 TX#: 5/ Date:    Tx#: 6/   Therex:  10x R lumbar rot stretch to tolerance, 5\" hold  15x multifidi ext in prone with pillow under abs and chest raise (partial range) Therex:  10x R lumbar rot stretch to tolerance, 5\" hold  Hook lying leg lift, 15  Seated multifidi ext to lumbar flexion, 15  Single leg heel raise, 10,8      Neuro re-ed:  12x SL sciatic nerve glides  with adductor bias, passive supine Neuro re-ed:  15x SL sciatic nerve glides  with adductor bias, passive supine  Seated slump gliding, 15      2x10 prone hip ext   3x20\" passive R hamstring stretch    3x20\" passive R hamstring stretch       Manual:  Effluerage to lumbar paraspinals GR II-III UPA R L1-L4, GR II CPA L1-L4  Gentle distraction of hips  X15 min  Prone press up with therapist PA at T10 and L1, partial range Manual:  Prone lumbar mutifidi STM, 6 min  S/l passive lumbar flexion, 4 min      HEP: : 2x10 pelvic tilts, 15x TA brace, 2x30\" hamstring stretch, 15x sciatic n glides, then adductor bias glides with floss x10 min, single leg heel raise    Charges: manual:1, therex:1, nreed:1      Total Timed Treatment: 45 min  Total Treatment Time: 45 min

## 2023-10-23 ENCOUNTER — APPOINTMENT (OUTPATIENT)
Dept: PHYSICAL THERAPY | Age: 33
End: 2023-10-23
Attending: NURSE PRACTITIONER
Payer: COMMERCIAL

## 2023-10-23 ENCOUNTER — OFFICE VISIT (OUTPATIENT)
Dept: PHYSICAL THERAPY | Facility: HOSPITAL | Age: 33
End: 2023-10-23
Attending: NURSE PRACTITIONER
Payer: COMMERCIAL

## 2023-10-23 PROCEDURE — 97140 MANUAL THERAPY 1/> REGIONS: CPT

## 2023-10-23 PROCEDURE — 97110 THERAPEUTIC EXERCISES: CPT

## 2023-10-23 NOTE — PROGRESS NOTES
Diagnosis:     S/P lumbar microdiscectomy (V71.509)    Referring Provider: Mark Bauer  Date of Evaluation:    10/11/2023    Precautions:   no lifting over 20 lbs s/p L5 microdisectomy on Sept 6, 2023. Next MD visit:   none scheduled  Date of Surgery: Sept 6, 2023. Insurance Primary/Secondary: [employer] HMO / N/A     # Auth Visits: 5 authorized per POC [employer] HMO            Subjective: Farhat continues to have numbness in the foot. His MD is hopeful that it is the nerve taking a while to restore itself. His MD appt went well. Pain: 0/10      Objective:   P1: posterior thigh  P2: back (upper lumbar) (not hurting currently, just with flexion)  P3: numbness is constant (some paresthesia with leg elevated which feels like relief then goes back to numb)  Current functional limitations include: bending, lifting, sitting, longer car rides, exercise right now (as post op). Sleep is ok. AROM: (* denotes performed with pain)  Flexion: limited 70%  Extension: min limited (hinges higher at T11)  Sidebending: R min limited; L min limited (hinges higher near T11)  Rotation: R nil limited (tightness); L nil limited (tightness)    SLR: 42 pre; 45 post    Single leg heel raise: R 9; L 20      Assessment: Farhat was able to tolerate pallof press with good form and no increased pain. He felt some tension at end range squats. Mobilized T9 and T10 as well as he is hypomobile here and tends to hinge here. Improved flexion noted at end of session, and no sensory changes noted in R foot.       Goals:   (to be met in 10 visits)   Pt will improve transversus abdominis recruitment to perform proper isometric contraction without requiring verbal or tactile cuing to promote advancement of therex   Pt will demonstrate good understanding of proper posture and body mechanics to decrease pain and improve spinal safety   Pt will improve lumbar spine AROM ext to >10 deg to allow increase ease withstanding for 30 min at a time  Pt will report improved symptom centralization and absence of radicular symptoms for 3 consecutive days to improve function with ADL   Pt will improve SLR to 60 deg on the R to demo decreased neural tension. Pt will have decreased paraspinal mm tension to tolerate standing >40 minutes for work and home activities   Pt will demonstrate improved core strength to be able to perform 10 squats with <2/10 pain   Pt will be independent and compliant with comprehensive HEP to maintain progress achieved in PT     Plan: Progress tolerance to loaded positions. Date: 10/16/2023  TX#: 2/5 auth Date:   10/20/23              TX#: 3/5 Date:  10/23/2023               TX#: 4/5 Date:                 TX#: 5/ Date:    Tx#: 6/   Therex:  10x R lumbar rot stretch to tolerance, 5\" hold  15x multifidi ext in prone with pillow under abs and chest raise (partial range) Therex:  10x R lumbar rot stretch to tolerance, 5\" hold  Hook lying leg lift, 15  Seated multifidi ext to lumbar flexion, 15  Single leg heel raise, 10,8 Therex:  2x10 dead bugs TA brace (progression from hooklying)  Seated multifidi ext to lumbar flexion, 15  Neuro re-ed:  15x SL sciatic nerve glides  with adductor bias, passive supine     Neuro re-ed:  12x SL sciatic nerve glides  with adductor bias, passive supine Neuro re-ed:  15x SL sciatic nerve glides  with adductor bias, passive supine  Seated slump gliding, 15 15x cat camel in quadruped  15x squats  15x pallof press, 20 lbs, B     2x10 prone hip ext   3x20\" passive R hamstring stretch    3x20\" passive R hamstring stretch  10x R lumbar rot stretch to tolerance, 5\" hold     Manual:  Effluerage to lumbar paraspinals GR II-III UPA R L1-L4, GR II CPA L1-L4  Gentle distraction of hips  X15 min  Prone press up with therapist PA at T10 and L1, partial range Manual:  Prone lumbar mutifidi STM, 6 min  S/l passive lumbar flexion, 4 min Manual:  Prone lumbar mutifidi STM, 6 min  S/l passive lumbar flexion,  Prone CPA T10, and L1-L3 x12 min     HEP: : 2x10 pelvic tilts, 15x TA brace, 2x30\" hamstring stretch, 15x sciatic n glides, then adductor bias glides with floss x10 min, single leg heel raise    Charges: manual:1, therex:2  Total Timed Treatment: 45 min  Total Treatment Time: 45 min

## 2023-10-24 ENCOUNTER — APPOINTMENT (OUTPATIENT)
Dept: PHYSICAL THERAPY | Facility: HOSPITAL | Age: 33
End: 2023-10-24
Attending: NURSE PRACTITIONER
Payer: COMMERCIAL

## 2023-10-25 NOTE — PROGRESS NOTES
Progress Summary  Pt has attended 5 visits in Physical Therapy. Diagnosis:     S/P lumbar microdiscectomy (J59.135)    Referring Provider: Julia Chavez  Date of Evaluation:    10/11/2023    Precautions:   no lifting over 20 lbs s/p L5 microdisectomy on Sept 6, 2023. Next MD visit:   none scheduled  Date of Surgery: Sept 6, 2023. Insurance Primary/Secondary: [employer] [place] HMO / N/A     # [facility] Visits: [facility]            Subjective: Still feels numbness 4th and 5th digit, maybe slightly better but if lifts leg, feels something going through those toes. Was told by physician no more restrictions but still is being cautious. Does not lift 3 y/o but has 35 year old . Standing an hour would be hard for him. Pain:  Back pain:  none at present, can feel intermittently 2/10  At worst: 2/10 at back and posterior thigh     Foot numbness: 6/10       Objective:   P1: posterior thigh  P2: back (upper lumbar) (not hurting currently, just with flexion)  P3: numbness is constant (some paresthesia with leg elevated which feels like relief then goes back to numb)  Current functional limitations include: bending, lifting, sitting, longer car rides, exercise right now (as post op). Sleep is ok. AROM: (* denotes performed with pain)  Flexion:55 dg with stretchin back R thigh   Extension: min limited (hinges higher at T11)  Sidebending: B to 1\" past knee joint line   Rotation: R nil limited (tightness); L nil limited (tightness)    SLR:   R: 44 (relief with plantar flexion)   L : 53 (relief with plantar flexion)     Single leg heel raise: R 9; L 20      Assessment: Mat has completed 5 visits post-op L5 microdisectomy on Sept 6 2023. LYNSEY demonstrates 64% improvement. Still limited with sitting tolerance d/t posterior thigh pain >30 minutes, still with back pain if tries to lift daughter, and numbness still in 4th and 5th digits with very, very slight improvement.  Demonstrates global improvement to lumbar ROM, progressing with SLR but still +. Skilled PT medically necessary in order to improve functional strength with bending, lifting, sitting and standing tolerance and maximize surgical outcomes. Goals:   (to be met in 13 visits)   Pt will improve transversus abdominis recruitment to perform proper isometric contraction without requiring verbal or tactile cuing to promote advancement of therex (MET)  Pt will demonstrate good understanding of proper posture and body mechanics to decrease pain and improve spinal safety   Pt will improve lumbar spine AROM ext to >10 deg to allow increase ease withstanding for 30 min at a time (in progress)  Pt will report improved symptom centralization and absence of radicular symptoms for 3 consecutive days to improve function with ADL (in progress)  Pt will improve SLR to 60 deg on the R to demo decreased neural tension. (Improving)   Pt will have decreased paraspinal mm tension to tolerate standing >40 minutes for work and home activities , MET upgraded to 1 hour   Pt will demonstrate improved core strength to be able to perform 10 squats with <2/10 pain (in progress)  Pt will be independent and compliant with comprehensive HEP to maintain progress achieved in PT     Oswestry Disability Index Score  Score: 78 % (7/19/2023 10:49 AM)    Post Oswestry Disability Index Score  Post Score: 14 % (10/27/2023  9:21 AM)    64 % improvement    Plan: Continue skilled Physical Therapy 1-2 x/week or a total of 8 additional visits over a 90 day period.  Treatment will include: therapeutic exercise including ROM, strengthening, stretching program; neuromuscular re-education for balance and proprioception, pelvic and core stabilization, patient education for posture, body mechanics, ergonomics; modalities as needed; manual therapy to include joint mobilization, distraction, and soft tissue mobilization as needed; HEP instruction and progression        Patient/Family/Caregiver was advised of these findings, precautions, and treatment options and has agreed to actively participate in planning and for this course of care. Thank you for your referral. If you have any questions, please contact me at Dept: [phone]. Sincerely,  Electronically signed by therapist: Olivier Turpin PT     Physician's certification required:  Yes  Please co-sign or sign and return this letter via fax as soon as possible to [phone]. I certify the need for these services furnished under this plan of treatment and while under my care. X___________________________________________________ Date____________________    Certification From: [id number]/62/1493  To:1/25/2024     Date: 10/16/2023  TX#: 2/5 auth Date:   10/20/23              TX#: 3/5 Date:  10/23/2023               TX#: 4/5 Date:  10/27/23               TX#: 5/ Date:    Tx#: 6/   Therex:  10x R lumbar rot stretch to tolerance, 5\" hold  15x multifidi ext in prone with pillow under abs and chest raise (partial range) Therex:  10x R lumbar rot stretch to tolerance, 5\" hold  Hook lying leg lift, 15  Seated multifidi ext to lumbar flexion, 15  Single leg heel raise, 10,8 Therex:  2x10 dead bugs TA brace (progression from hooklying)  Seated multifidi ext to lumbar flexion, 15  Neuro re-ed:  15x SL sciatic nerve glides  with adductor bias, passive supine Therapeutic Exercise:   LYNSEY and re-assessment   Sciatic nerve glide correction 10x   TRX squats 2 x 15  Open book R 10 x 5s   LBW GTB x 2 laps 12'     Neuro re-ed:  12x SL sciatic nerve glides  with adductor bias, passive supine Neuro re-ed:  15x SL sciatic nerve glides  with adductor bias, passive supine  Seated slump gliding, 15 15x cat camel in quadruped  15x squats  15x pallof press, 20 lbs, B     2x10 prone hip ext   3x20\" passive R hamstring stretch    3x20\" passive R hamstring stretch  10x R lumbar rot stretch to tolerance, 5\" hold Neuro Re-Ed:  Dead bug with UE bias 3 sets 30s  Multifidus walks 2 x 5 ea O band Manual:  Effluerage to lumbar paraspinals GR II-III UPA R L1-L4, GR II CPA L1-L4  Gentle distraction of hips  X15 min  Prone press up with therapist PA at T10 and L1, partial range Manual:  Prone lumbar mutifidi STM, 6 min  S/l passive lumbar flexion, 4 min Manual:  Prone lumbar mutifidi STM, 6 min  S/l passive lumbar flexion,  Prone CPA T10, and L1-L3 x12 min Manual:  Prone lumbar mutifidi STM, 6 min  Prone C PA T10-T12 G3     HEP: : 2x10 pelvic tilts, 15x TA brace, 2x30\" hamstring stretch, 15x sciatic n glides, then adductor bias glides with floss x10 min, single leg heel raise    Charges: manual:1, therex:1, NR: 1 Total Timed Treatment: 43 min  Total Treatment Time: 43 min

## 2023-10-27 ENCOUNTER — APPOINTMENT (OUTPATIENT)
Dept: PHYSICAL THERAPY | Facility: HOSPITAL | Age: 33
End: 2023-10-27
Attending: NURSE PRACTITIONER
Payer: COMMERCIAL

## 2023-10-27 ENCOUNTER — OFFICE VISIT (OUTPATIENT)
Dept: PHYSICAL THERAPY | Age: 33
End: 2023-10-27
Attending: NURSE PRACTITIONER
Payer: COMMERCIAL

## 2023-10-27 PROCEDURE — 97110 THERAPEUTIC EXERCISES: CPT

## 2023-10-27 PROCEDURE — 97112 NEUROMUSCULAR REEDUCATION: CPT

## 2023-10-27 PROCEDURE — 97140 MANUAL THERAPY 1/> REGIONS: CPT

## 2023-10-30 ENCOUNTER — APPOINTMENT (OUTPATIENT)
Dept: PHYSICAL THERAPY | Age: 33
End: 2023-10-30
Attending: NURSE PRACTITIONER
Payer: COMMERCIAL

## 2023-10-30 ENCOUNTER — APPOINTMENT (OUTPATIENT)
Dept: PHYSICAL THERAPY | Facility: HOSPITAL | Age: 33
End: 2023-10-30
Attending: NURSE PRACTITIONER
Payer: COMMERCIAL

## 2023-10-30 PROCEDURE — 97110 THERAPEUTIC EXERCISES: CPT

## 2023-10-30 PROCEDURE — 97140 MANUAL THERAPY 1/> REGIONS: CPT

## 2023-10-30 NOTE — PROGRESS NOTES
Progress Summary  Pt has attended 5 visits in Physical Therapy. Diagnosis:     S/P lumbar microdiscectomy (U53.092)    Referring Provider: George Fajardo  Date of Evaluation:    10/11/2023    Precautions:   no lifting over 20 lbs s/p L5 microdisectomy on Sept 6, 2023. Next MD visit:   none scheduled  Date of Surgery: Sept 6, 2023. Insurance Primary/Secondary: [address] [place] [address] HMO / N/A     # Auth Visits: 5 authorized per [address] + 10 additional auth           Subjective: Mckenzie Adams states he feels improved circulation into his R foot on stairs. Cont to have some pain in his back. Pain:  Back pain:  none at present, can feel intermittently 2/10  At worst: 2/10 at back and posterior thigh     Foot numbness: 5/10       Objective:   P1: posterior thigh  P2: back (upper lumbar) (not hurting currently, just with flexion)  P3: numbness is constant (some paresthesia with leg elevated which feels like relief then goes back to numb)  Current functional limitations include: bending, lifting, sitting, longer car rides, exercise right now (as post op). Sleep is ok. AROM: (* denotes performed with pain)  Flexion:55 dg with stretchin back R thigh   Extension: min limited (hinges higher at T11)  Sidebending: B to 1\" past knee joint line   Rotation: R nil limited (tightness); L nil limited (tightness)    SLR:   R: 44 (relief with plantar flexion)   L : 53 (relief with plantar flexion)     Single leg heel raise: R 9; L 20      Assessment: Mat was able to tolerate higher level exercises, and denied pain with chops. He is able to tolerate higher level multifidi ext off edge of mat. No pain noted with SL and denied radicular pain throughout.        Goals:   (to be met in 13 visits)   Pt will improve transversus abdominis recruitment to perform proper isometric contraction without requiring verbal or tactile cuing to promote advancement of therex (MET)  Pt will demonstrate good understanding of proper posture and body mechanics to decrease pain and improve spinal safety   Pt will improve lumbar spine AROM ext to >10 deg to allow increase ease withstanding for 30 min at a time (in progress)  Pt will report improved symptom centralization and absence of radicular symptoms for 3 consecutive days to improve function with ADL (in progress)  Pt will improve SLR to 60 deg on the R to demo decreased neural tension. (Improving)   Pt will have decreased paraspinal mm tension to tolerate standing >40 minutes for work and home activities , MET upgraded to 1 hour   Pt will demonstrate improved core strength to be able to perform 10 squats with <2/10 pain (in progress)  Pt will be independent and compliant with comprehensive HEP to maintain progress achieved in PT. Progressing. Oswestry Disability Index Score  Score: 78 % (7/19/2023 10:49 AM)    Post Oswestry Disability Index Score  Post Score: 14 % (10/27/2023  9:21 AM)    64 % improvement    Plan: Progress higher level activities.    Date: 10/16/2023  TX#: 2/5 auth Date:   10/20/23              TX#: 3/5 Date:  10/23/2023               TX#: 4/5 Date:  10/27/23               TX#: 5/ Date: 10/30/2023   Tx#: 6/15   Therex:  10x R lumbar rot stretch to tolerance, 5\" hold  15x multifidi ext in prone with pillow under abs and chest raise (partial range) Therex:  10x R lumbar rot stretch to tolerance, 5\" hold  Hook lying leg lift, 15  Seated multifidi ext to lumbar flexion, 15  Single leg heel raise, 10,8 Therex:  2x10 dead bugs TA brace (progression from hooklying)  Seated multifidi ext to lumbar flexion, 15  Neuro re-ed:  15x SL sciatic nerve glides  with adductor bias, passive supine Therapeutic Exercise:   LYNSEY and re-assessment   Sciatic nerve glide correction 10x   TRX squats 2 x 15  Open book R 10 x 5s   LBW GTB x 2 laps 12'  Therex:  5x lumbar ext with bias R  15x TRX squats  2x10 hip abd in SLS with blue T-band   Neuro re-ed:  12x SL sciatic nerve glides  with adductor bias, passive supine Neuro re-ed:  15x SL sciatic nerve glides  with adductor bias, passive supine  Seated slump gliding, 15 15x cat camel in quadruped  15x squats  15x pallof press, 20 lbs, B  15x chops, 20 lbs  2x10 lumbar ext over mat table, support at legs  2x10 glute ext shuttle press, 37 lbs, B   2x10 prone hip ext   3x20\" passive R hamstring stretch    3x20\" passive R hamstring stretch  10x R lumbar rot stretch to tolerance, 5\" hold Neuro Re-Ed:  Dead bug with UE bias 3 sets 30s  Multifidus walks 2 x 5 ea O band     2x7 SL bridges  Dead bug with UE bias 2 sets 30s   Manual:  Effluerage to lumbar paraspinals GR II-III UPA R L1-L4, GR II CPA L1-L4  Gentle distraction of hips  X15 min  Prone press up with therapist PA at T10 and L1, partial range Manual:  Prone lumbar mutifidi STM, 6 min  S/l passive lumbar flexion, 4 min Manual:  Prone lumbar mutifidi STM, 6 min  S/l passive lumbar flexion,  Prone CPA T10, and L1-L3 x12 min Manual:  Prone lumbar mutifidi STM, 6 min  Prone C PA T10-T12 G3  Manual:  Prone lumbar mutifidi STM, 6 min  Prone C PA T10-T12 G3    HEP: : 2x10 pelvic tilts, 15x TA brace, 2x30\" hamstring stretch, 15x sciatic n glides, then adductor bias glides with floss x10 min, single leg heel raise    Charges: manual:1, therex:2 Total Timed Treatment: 43 min  Total Treatment Time: 43 min

## 2023-11-01 ENCOUNTER — APPOINTMENT (OUTPATIENT)
Dept: PHYSICAL THERAPY | Facility: HOSPITAL | Age: 33
End: 2023-11-01
Attending: NURSE PRACTITIONER
Payer: COMMERCIAL

## 2023-11-01 ENCOUNTER — OFFICE VISIT (OUTPATIENT)
Dept: PHYSICAL THERAPY | Age: 33
End: 2023-11-01
Attending: NURSE PRACTITIONER
Payer: COMMERCIAL

## 2023-11-01 PROCEDURE — 97140 MANUAL THERAPY 1/> REGIONS: CPT

## 2023-11-01 PROCEDURE — 97110 THERAPEUTIC EXERCISES: CPT

## 2023-11-01 NOTE — PROGRESS NOTES
Diagnosis:     S/P lumbar microdiscectomy (I49.195)    Referring Provider: Avni Flores  Date of Evaluation:    10/11/2023    Precautions:   no lifting over 20 lbs s/p L5 microdisectomy on Sept 6, 2023. Next MD visit:   none scheduled  Date of Surgery: Sept 6, 2023. [facility] / N/A     # Auth Visits: 10 authorized         Subjective: A little bit of soreness in back muscles after last visit. Lasted about 1.5 days. Pain:  Back pain:  none at present, when sitting down can feel intermittently 2/10 and into posterior thigh     Foot numbness: 5/10       Objective:   P1: posterior thigh  P2: back (upper lumbar) (not hurting currently, just with flexion)  P3: numbness is constant (some paresthesia with leg elevated which feels like relief then goes back to numb)  Current functional limitations include: bending, lifting, sitting, longer car rides, exercise right now (as post op). Sleep is ok. AROM: (* denotes performed with pain)  Flexion:55 dg with stretchin back R thigh   Extension: min limited (hinges higher at T11)  Sidebending: B to 1\" past knee joint line   Rotation: R nil limited (tightness); L nil limited (tightness)    SLR:   R: 44 (relief with plantar flexion)   L : 53 (relief with plantar flexion)     Single leg heel raise: R 9; L 20      Assessment: Continued focus on higher level core and gluteal strengthening. Work on functional positions to include quadruped and squat with chop. Did feel mild cramp posterior thigh with bird dog leg extension but otherwise no increase in LE symptoms. Does not feel pain or stretch anywhere end of session.      Goals:   (to be met in 13 visits)   Pt will improve transversus abdominis recruitment to perform proper isometric contraction without requiring verbal or tactile cuing to promote advancement of therex (MET)  Pt will demonstrate good understanding of proper posture and body mechanics to decrease pain and improve spinal safety Pt will improve lumbar spine AROM ext to >10 deg to allow increase ease withstanding for 30 min at a time (in progress)  Pt will report improved symptom centralization and absence of radicular symptoms for 3 consecutive days to improve function with ADL (in progress)  Pt will improve SLR to 60 deg on the R to demo decreased neural tension. (Improving)   Pt will have decreased paraspinal mm tension to tolerate standing >40 minutes for work and home activities , MET upgraded to 1 hour   Pt will demonstrate improved core strength to be able to perform 10 squats with <2/10 pain (in progress)  Pt will be independent and compliant with comprehensive HEP to maintain progress achieved in PT. Progressing.     Oswestry Disability Index Score  Score: 78 % (7/19/2023 10:49 AM)    Post Oswestry Disability Index Score  Post Score: 14 % (10/27/2023  9:21 AM)    64 % improvement    Plan: Progress higher level activities; cont at 1x/week with HEP   Date: 10/16/2023  TX#: 2/5 auth Date:   10/20/23              TX#: 3/5 Date:  10/23/2023               TX#: 4/5 Date:  10/27/23               TX#: 5/ Date: 10/30/2023   Tx#: 6/15 11/1/2023  7/15   Therex:  10x R lumbar rot stretch to tolerance, 5\" hold  15x multifidi ext in prone with pillow under abs and chest raise (partial range) Therex:  10x R lumbar rot stretch to tolerance, 5\" hold  Hook lying leg lift, 15  Seated multifidi ext to lumbar flexion, 15  Single leg heel raise, 10,8 Therex:  2x10 dead bugs TA brace (progression from hooklying)  Seated multifidi ext to lumbar flexion, 15  Neuro re-ed:  15x SL sciatic nerve glides  with adductor bias, passive supine Therapeutic Exercise:   LYNSEY and re-assessment   Sciatic nerve glide correction 10x   TRX squats 2 x 15  Open book R 10 x 5s   LBW GTB x 2 laps 12'  Therex:  5x lumbar ext with bias R  15x TRX squats  2x10 hip abd in SLS with blue T-band Therapeutic Exercise:   15x2 TRX squats  Monster walks GTB 3 laps 2 feet, forward/retro Quadruped ext press 2 x 10 ea, 3 bands   Neuro re-ed:  12x SL sciatic nerve glides  with adductor bias, passive supine Neuro re-ed:  15x SL sciatic nerve glides  with adductor bias, passive supine  Seated slump gliding, 15 15x cat camel in quadruped  15x squats  15x pallof press, 20 lbs, B  15x chops, 20 lbs  2x10 lumbar ext over mat table, support at legs  2x10 glute ext shuttle press, 37 lbs, B Squat chop 4# ball x 8 ea   Active rest levon pose 2 x 20s    2x10 prone hip ext   3x20\" passive R hamstring stretch    3x20\" passive R hamstring stretch  10x R lumbar rot stretch to tolerance, 5\" hold Neuro Re-Ed:  Dead bug with UE bias 3 sets 30s  Multifidus walks 2 x 5 ea O band     2x7 SL bridges  Dead bug with UE bias 2 sets 30s Neuro Re-Ed:  Bird dog 8 ea alt, 2 sets   Dead bug with opposing UE 4.5# 2 sets to tired    Manual:  Effluerage to lumbar paraspinals GR II-III UPA R L1-L4, GR II CPA L1-L4  Gentle distraction of hips  X15 min  Prone press up with therapist PA at T10 and L1, partial range Manual:  Prone lumbar mutifidi STM, 6 min  S/l passive lumbar flexion, 4 min Manual:  Prone lumbar mutifidi STM, 6 min  S/l passive lumbar flexion,  Prone CPA T10, and L1-L3 x12 min Manual:  Prone lumbar mutifidi STM, 6 min  Prone C PA T10-T12 G3  Manual:  Prone lumbar mutifidi STM, 6 min  Prone C PA T10-T12 G3  Manual Therapy(9')  Prone lumbar mutifidi STM,  Prone C PA T10-T12 G3      HEP: : 2x10 pelvic tilts, 15x TA brace, 2x30\" hamstring stretch, 15x sciatic n glides, then adductor bias glides with floss x10 min, single leg heel raise    Charges: manual:1, therex:2 Total Timed Treatment: 39 min  Total Treatment Time: 39 min

## 2023-11-06 ENCOUNTER — APPOINTMENT (OUTPATIENT)
Dept: PHYSICAL THERAPY | Age: 33
End: 2023-11-06
Attending: FAMILY MEDICINE
Payer: COMMERCIAL

## 2023-11-06 ENCOUNTER — OFFICE VISIT (OUTPATIENT)
Dept: PHYSICAL THERAPY | Facility: HOSPITAL | Age: 33
End: 2023-11-06
Attending: NURSE PRACTITIONER
Payer: COMMERCIAL

## 2023-11-06 PROCEDURE — 97110 THERAPEUTIC EXERCISES: CPT

## 2023-11-06 PROCEDURE — 97140 MANUAL THERAPY 1/> REGIONS: CPT

## 2023-11-06 NOTE — PROGRESS NOTES
Diagnosis:     S/P lumbar microdiscectomy (U61.921)    Referring Provider: Tootie Aguilera  Date of Evaluation:    10/11/2023    Precautions:   no lifting over 20 lbs s/p L5 microdisectomy on Sept 6, 2023. Next MD visit:   none scheduled  Date of Surgery: Sept 6, 2023. Insurance Primary/Secondary: [employer] [facility] HMO / N/A     # Auth Visits: 10 authorized         Subjective: Farhat states that his back is sore from his lumbar support, but getting used to it he feels. Pain:  Back pain:  none at present, when sitting down can feel intermittently 2/10 and into posterior thigh     Foot numbness: 5/10       Objective:   P1: posterior thigh  P2: back (upper lumbar) (not hurting currently, just with flexion)  P3: numbness is constant (some paresthesia with leg elevated which feels like relief then goes back to numb)  Current functional limitations include: bending, lifting, sitting, longer car rides, exercise right now (as post op). Sleep is ok. AROM: (* denotes performed with pain)  Flexion:55 dg with stretchin back R thigh   Extension: min limited (hinges higher at T11)  Sidebending: B to 1\" past knee joint line   Rotation: R nil limited (tightness); L nil limited (tightness)    SLR:   R: 44 (relief with plantar flexion)   L : 53 (relief with plantar flexion)     Single leg heel raise: R 9; L 20      Assessment: Farhat was able to perform split squats with good form. No pain/tingling noted with rotational press either. He felt a good stretch with seated slump on the R. Cont to progress well towards LTG.     Goals:   (to be met in 13 visits)   Pt will improve transversus abdominis recruitment to perform proper isometric contraction without requiring verbal or tactile cuing to promote advancement of therex (MET)  Pt will demonstrate good understanding of proper posture and body mechanics to decrease pain and improve spinal safety   Pt will improve lumbar spine AROM ext to >10 deg to allow increase ease withstanding for 30 min at a time (in progress)  Pt will report improved symptom centralization and absence of radicular symptoms for 3 consecutive days to improve function with ADL (in progress)  Pt will improve SLR to 60 deg on the R to demo decreased neural tension. (Improving)   Pt will have decreased paraspinal mm tension to tolerate standing >40 minutes for work and home activities , MET upgraded to 1 hour   Pt will demonstrate improved core strength to be able to perform 10 squats with <2/10 pain (in progress)  Pt will be independent and compliant with comprehensive HEP to maintain progress achieved in PT. Progressing.     Oswestry Disability Index Score  Score: 78 % (7/19/2023 10:49 AM)    Post Oswestry Disability Index Score  Post Score: 14 % (10/27/2023  9:21 AM)    64 % improvement    Plan: Progress higher level activities; cont at 1x/week with HEP   Date: 10/16/2023  TX#: 2/5 auth Date:   10/20/23              TX#: 3/5 Date:  10/23/2023               TX#: 4/5 Date:  10/27/23               TX#: 5/ Date: 10/30/2023   Tx#: 6/15 11/1/2023  7/15 Date:11/6/2023   Tx#:8/15   Therex:  10x R lumbar rot stretch to tolerance, 5\" hold  15x multifidi ext in prone with pillow under abs and chest raise (partial range) Therex:  10x R lumbar rot stretch to tolerance, 5\" hold  Hook lying leg lift, 15  Seated multifidi ext to lumbar flexion, 15  Single leg heel raise, 10,8 Therex:  2x10 dead bugs TA brace (progression from hooklying)  Seated multifidi ext to lumbar flexion, 15  Neuro re-ed:  15x SL sciatic nerve glides  with adductor bias, passive supine Therapeutic Exercise:   LYNSEY and re-assessment   Sciatic nerve glide correction 10x   TRX squats 2 x 15  Open book R 10 x 5s   LBW GTB x 2 laps 12'  Therex:  5x lumbar ext with bias R  15x TRX squats  2x10 hip abd in SLS with blue T-band Therapeutic Exercise:   15x2 TRX squats  Monster walks GTB 3 laps 2 feet, forward/retro   Quadruped ext press 2 x 10 ea, 3 bands Therex:  2x15 split squats at chair  2x10 rotational press, 20 lbs  Seated at edge of mat table for slump sciatic n glide on R. Neuro re-ed:  12x SL sciatic nerve glides  with adductor bias, passive supine Neuro re-ed:  15x SL sciatic nerve glides  with adductor bias, passive supine  Seated slump gliding, 15 15x cat camel in quadruped  15x squats  15x pallof press, 20 lbs, B  15x chops, 20 lbs  2x10 lumbar ext over mat table, support at legs  2x10 glute ext shuttle press, 37 lbs, B Squat chop 4# ball x 8 ea   Active rest levon pose 2 x 20s  2x10 goblet squats, 7 lb med ball  2x10 backwrds walkouts, 35 lbs   2x10 prone hip ext   3x20\" passive R hamstring stretch    3x20\" passive R hamstring stretch  10x R lumbar rot stretch to tolerance, 5\" hold Neuro Re-Ed:  Dead bug with UE bias 3 sets 30s  Multifidus walks 2 x 5 ea O band     2x7 SL bridges  Dead bug with UE bias 2 sets 30s Neuro Re-Ed:  Bird dog 8 ea alt, 2 sets   Dead bug with opposing UE 4.5# 2 sets to tired  15x multifidi back ext  prone off EOM.    Manual:  Effluerage to lumbar paraspinals GR II-III UPA R L1-L4, GR II CPA L1-L4  Gentle distraction of hips  X15 min  Prone press up with therapist PA at T10 and L1, partial range Manual:  Prone lumbar mutifidi STM, 6 min  S/l passive lumbar flexion, 4 min Manual:  Prone lumbar mutifidi STM, 6 min  S/l passive lumbar flexion,  Prone CPA T10, and L1-L3 x12 min Manual:  Prone lumbar mutifidi STM, 6 min  Prone C PA T10-T12 G3  Manual:  Prone lumbar mutifidi STM, 6 min  Prone C PA T10-T12 G3  Manual Therapy(9')  Prone lumbar mutifidi STM,  Prone C PA T10-T12 G3    Manual Therapy(9')  Prone lumbar mutifidi STM,  Prone C PA T10-T12 G3    HEP: : 2x10 pelvic tilts, 15x TA brace, 2x30\" hamstring stretch, 15x sciatic n glides, then adductor bias glides with floss x10 min, single leg heel raise    Charges: manual:1, therex:2 Total Timed Treatment: 39 min  Total Treatment Time: 39 min

## 2023-11-08 ENCOUNTER — APPOINTMENT (OUTPATIENT)
Dept: PHYSICAL THERAPY | Facility: HOSPITAL | Age: 33
End: 2023-11-08
Attending: NURSE PRACTITIONER
Payer: COMMERCIAL

## 2023-11-13 ENCOUNTER — OFFICE VISIT (OUTPATIENT)
Dept: PHYSICAL THERAPY | Facility: HOSPITAL | Age: 33
End: 2023-11-13
Attending: NURSE PRACTITIONER
Payer: COMMERCIAL

## 2023-11-13 PROCEDURE — 97110 THERAPEUTIC EXERCISES: CPT

## 2023-11-13 PROCEDURE — 97140 MANUAL THERAPY 1/> REGIONS: CPT

## 2023-11-13 NOTE — PROGRESS NOTES
Diagnosis:     S/P lumbar microdiscectomy (H58.681)    Referring Provider: Cayetano Vernon  Date of Evaluation:    10/11/2023    Precautions:   no lifting over 20 lbs s/p L5 microdisectomy on Sept 6, 2023. Next MD visit:   none scheduled  Date of Surgery: Sept 6, 2023. Insurance Primary/Secondary: [employer] HMO / N/A     # Auth Visits: 10 authorized         Subjective: Farhat states he is doing well. He notices some back pain when he is remodeling his bathroom. Pain:  Back pain:  none at present, when sitting down can feel intermittently 2/10 and into posterior thigh     Foot numbness: 3/10       Objective:   P1: posterior thigh  P2: back (upper lumbar) (not hurting currently, just with flexion)  P3: numbness is constant (some paresthesia with leg elevated which feels like relief then goes back to numb)  Current functional limitations include: bending, lifting, sitting, longer car rides, exercise right now (as post op). Sleep is ok. AROM: (* denotes performed with pain)  Flexion:55 dg with stretchin back R thigh   Extension: min limited (hinges higher at T11)  Sidebending: B to 1\" past knee joint line   Rotation: R nil limited (tightness); L nil limited (tightness)    SLR:   R: 44 (relief with plantar flexion)   L : 53 (relief with plantar flexion)     Single leg heel raise: R 9; L 20      Assessment: Farhat is able to perform SL press in shuttle at 75 lbs with good form and no pain. He was also introduced to lat pull downs, which he tolerated well. He is progressing well towards.     Goals:   (to be met in 13 visits)   Pt will improve transversus abdominis recruitment to perform proper isometric contraction without requiring verbal or tactile cuing to promote advancement of therex (MET)  Pt will demonstrate good understanding of proper posture and body mechanics to decrease pain and improve spinal safety   Pt will improve lumbar spine AROM ext to >10 deg to allow increase ease withstanding for 30 min at a time (in progress)  Pt will report improved symptom centralization and absence of radicular symptoms for 3 consecutive days to improve function with ADL (in progress)  Pt will improve SLR to 60 deg on the R to demo decreased neural tension. (Improving)   Pt will have decreased paraspinal mm tension to tolerate standing >40 minutes for work and home activities , MET upgraded to 1 hour   Pt will demonstrate improved core strength to be able to perform 10 squats with <2/10 pain (in progress)  Pt will be independent and compliant with comprehensive HEP to maintain progress achieved in PT. Progressing.     Oswestry Disability Index Score  Score: 78 % (7/19/2023 10:49 AM)    Post Oswestry Disability Index Score  Post Score: 14 % (10/27/2023  9:21 AM)    64 % improvement    Plan: Progress higher level activities; cont at 1x/week with HEP   Date: 10/16/2023  TX#: 2/5 auth Date:   10/20/23              TX#: 3/5 Date:  10/23/2023               TX#: 4/5 Date:  10/27/23               TX#: 5/ Date: 10/30/2023   Tx#: 6/15 11/1/2023  7/15 Date:11/6/2023   Tx#:8/15 Date:11/13/2023   Tx#:9/15   Therex:  10x R lumbar rot stretch to tolerance, 5\" hold  15x multifidi ext in prone with pillow under abs and chest raise (partial range) Therex:  10x R lumbar rot stretch to tolerance, 5\" hold  Hook lying leg lift, 15  Seated multifidi ext to lumbar flexion, 15  Single leg heel raise, 10,8 Therex:  2x10 dead bugs TA brace (progression from hooklying)  Seated multifidi ext to lumbar flexion, 15  Neuro re-ed:  15x SL sciatic nerve glides  with adductor bias, passive supine Therapeutic Exercise:   LYNSEY and re-assessment   Sciatic nerve glide correction 10x   TRX squats 2 x 15  Open book R 10 x 5s   LBW GTB x 2 laps 12'  Therex:  5x lumbar ext with bias R  15x TRX squats  2x10 hip abd in SLS with blue T-band Therapeutic Exercise:   15x2 TRX squats  Monster walks GTB 3 laps 2 feet, forward/retro   Quadruped ext press 2 x 10 ea, 3 bands Therex:  2x15 split squats at chair  2x10 rotational press, 20 lbs  Seated at edge of mat table for slump sciatic n glide on R. Therex:  2x10 lat pull down, 70 lbs   Neuro re-ed:  12x SL sciatic nerve glides  with adductor bias, passive supine Neuro re-ed:  15x SL sciatic nerve glides  with adductor bias, passive supine  Seated slump gliding, 15 15x cat camel in quadruped  15x squats  15x pallof press, 20 lbs, B  15x chops, 20 lbs  2x10 lumbar ext over mat table, support at legs  2x10 glute ext shuttle press, 37 lbs, B Squat chop 4# ball x 8 ea   Active rest levon pose 2 x 20s  2x10 goblet squats, 7 lb med ball  2x10 backwrds walkouts, 35 lbs 10x squat to lift diagonal PNF, 8 lb ball, B  4x20\" lat TRX   2x10 SL shuttle press, 75 lbs   2x10 prone hip ext   3x20\" passive R hamstring stretch    3x20\" passive R hamstring stretch  10x R lumbar rot stretch to tolerance, 5\" hold Neuro Re-Ed:  Dead bug with UE bias 3 sets 30s  Multifidus walks 2 x 5 ea O band     2x7 SL bridges  Dead bug with UE bias 2 sets 30s Neuro Re-Ed:  Bird dog 8 ea alt, 2 sets   Dead bug with opposing UE 4.5# 2 sets to tired  15x multifidi back ext  prone off EOM.  2x10 shelf position TA brace c shoulder full shld flex holding 4 lb med ball   Manual:  Effluerage to lumbar paraspinals GR II-III UPA R L1-L4, GR II CPA L1-L4  Gentle distraction of hips  X15 min  Prone press up with therapist PA at T10 and L1, partial range Manual:  Prone lumbar mutifidi STM, 6 min  S/l passive lumbar flexion, 4 min Manual:  Prone lumbar mutifidi STM, 6 min  S/l passive lumbar flexion,  Prone CPA T10, and L1-L3 x12 min Manual:  Prone lumbar mutifidi STM, 6 min  Prone C PA T10-T12 G3  Manual:  Prone lumbar mutifidi STM, 6 min  Prone C PA T10-T12 G3  Manual Therapy(9')  Prone lumbar mutifidi STM,  Prone C PA T10-T12 G3    Manual Therapy(9')  Prone lumbar mutifidi STM,  Prone C PA T10-T12 G3  15x hamstring bridge red SB  2x10 ham bridges, red SB   Manual Therapy(9')  Prone lumbar mutifidi STM, SL rot and flex for assessment  Prone C PA T10-TL2 G3   HEP: : 2x10 pelvic tilts, 15x TA brace, 2x30\" hamstring stretch, 15x sciatic n glides, then adductor bias glides with floss x10 min, single leg heel raise    Charges: manual:1, therex:2 Total Timed Treatment: 41 min  Total Treatment Time: 41 min

## 2023-11-16 ENCOUNTER — TELEPHONE (OUTPATIENT)
Dept: PHYSICAL THERAPY | Facility: HOSPITAL | Age: 33
End: 2023-11-16

## 2023-11-20 ENCOUNTER — OFFICE VISIT (OUTPATIENT)
Dept: PHYSICAL THERAPY | Facility: HOSPITAL | Age: 33
End: 2023-11-20
Attending: NURSE PRACTITIONER
Payer: COMMERCIAL

## 2023-11-20 PROCEDURE — 97140 MANUAL THERAPY 1/> REGIONS: CPT

## 2023-11-20 PROCEDURE — 97110 THERAPEUTIC EXERCISES: CPT

## 2023-11-20 NOTE — PROGRESS NOTES
Diagnosis:     S/P lumbar microdiscectomy (I97.127)    Referring Provider: Judah Glover  Date of Evaluation:    10/11/2023    Precautions:   no lifting over 20 lbs s/p L5 microdisectomy on Sept 6, 2023. Next MD visit:   none scheduled  Date of Surgery: Sept 6, 2023. [facility]     # Auth Visits: 10 authorized        Progress Summary  Pt has attended 10 visits in Physical Therapy. Subjective: Mat reports improvements in pain levels in everyday life. He is able to lift his kids with improved ease, the pain does not come as easily as it did before. Pain:  Back pain:  none at present, when sitting down can feel intermittently 2/10 and into posterior thigh     Foot numbness: 1/10       Objective:   P1: posterior thigh  P2: back (upper lumbar) (not hurting currently, just with flexion)  P3: numbness is better in R foot, but cotninues(some paresthesia with leg elevated which feels like relief then goes back to numb)  Current functional limitations include: bending, lifting, sitting, longer car rides, exercise right now (as post op). Sleep is ok.       AROM: (* denotes performed with pain)  Flexion:62 deg (improved from 55 deg) with stretch in back R thigh   Extension: 7 deg; min limited (hinges higher at T11)  Sidebending: B to 1\" past knee joint line   Rotation: R min to nil limited (tightness); L nil limited (tightness)    SLR:   R: 50 deg (increased from 44 )(relief with plantar flexion)   L : 65 deg improved from 53 deg    Single leg heel raise: R 14 x (improved from 9); L 20      Strength: (* denotes performed with pain)  LE   Hip flexion (L2): R 5/5; L 5/5  Hip abduction: R NT/5; L NT/5  Hip Extension: R NT/5; L NT/5            Hip ER: R 5/5; L 5/5  Hip IR: R NT/5; L NT/5  Knee Flexion: R 5/5; L 5/5            Knee extension (L3): R 5/5; L 5/5            DF (L4): R 5/5; L 5/5  Great Toe Ext (L5): R 5/5, L 5/5  PF (S1): R 4-/5; L 5/5         Assessment: Mat continues to demonstrate improved AROM of his lumbar spine in all planes. His R ankle DF has improved to 5/5 but continues to lack some PF strength. The weakness and numbness limitations are likely still from nerve compression. He continues to progress well towards LTG and demonstrates improved postural awareness and control. His Ulises score continues to improve as well. Increased ability for segmental flexion of lumbar spine noted, but painful at end range. He would benefit from further therapy to meet LTG. Goals:   (to be met in 13 visits)   Pt will improve transversus abdominis recruitment to perform proper isometric contraction without requiring verbal or tactile cuing to promote advancement of therex (MET)  Pt will demonstrate good understanding of proper posture and body mechanics to decrease pain and improve spinal safety   Pt will improve lumbar spine AROM ext to >10 deg to allow increase ease withstanding for 30 min at a time (in progress)  Pt will report improved symptom centralization and absence of radicular symptoms for 3 consecutive days to improve function with ADL (in progress)  Pt will improve SLR to 60 deg on the R to demo decreased neural tension. (Improving)   Pt will have decreased paraspinal mm tension to tolerate standing >40 minutes for work and home activities , MET upgraded to 1 hour   Pt will demonstrate improved core strength to be able to perform 10 squats with <2/10 pain (in progress)  Pt will be independent and compliant with comprehensive HEP to maintain progress achieved in PT. Progressing. Oswestry Disability Index Score  Score: 78 % (7/19/2023 10:49 AM)    Post Oswestry Disability Index Score  Post Score: 14 % (10/27/2023  9:21 AM)    64 % improvement  Post Oswestry Disability Index Score  Post Score: 10 % (11/20/2023 11:57 AM)    68 % improvement    Plan: Continue skilled Physical Therapy 1-2 x/week or a total of 6-8 visits over a 30 day period.  Treatment will include: manual, strength training, therex, neuro re-ed. Patient/Family/Caregiver was advised of these findings, precautions, and treatment options and has agreed to actively participate in planning and for this course of care. Thank you for your referral. If you have any questions, please contact me at Dept: [phone]. Sincerely,  Electronically signed by therapist: Phillip Villarreal PT , DPT    Physician's certification required:  Yes  Please co-sign or sign and return this letter via fax as soon as possible to [phone]. I certify the need for these services furnished under this plan of treatment and while under my care. X___________________________________________________ Date____________________    Certification From: [id number]  To:2/18/2024    Date:  10/27/23               TX#: 5/ Date: 10/30/2023   Tx#: 6/15 11/1/2023  7/15 Date:11/6/2023   Tx#:8/15 Date:11/13/2023   Tx#:9/15 Date:11/20/2023   Tx#:10/15   Therapeutic Exercise:   LYNSEY and re-assessment   Sciatic nerve glide correction 10x   TRX squats 2 x 15  Open book R 10 x 5s   LBW GTB x 2 laps 12'  Therex:  5x lumbar ext with bias R  15x TRX squats  2x10 hip abd in SLS with blue T-band Therapeutic Exercise:   15x2 TRX squats  Monster walks GTB 3 laps 2 feet, forward/retro   Quadruped ext press 2 x 10 ea, 3 bands Therex:  2x15 split squats at chair  2x10 rotational press, 20 lbs  Seated at edge of mat table for slump sciatic n glide on R.  Therex:  2x10 lat pull down, 70 lbs Therex:  10x segmental lumbar flex seated  10x thoracic ext over foam roll with OP    15x chops, 20 lbs  2x10 lumbar ext over mat table, support at legs  2x10 glute ext shuttle press, 37 lbs, B Squat chop 4# ball x 8 ea   Active rest levon pose 2 x 20s  2x10 goblet squats, 7 lb med ball  2x10 backwrds walkouts, 35 lbs 10x squat to lift diagonal PNF, 8 lb ball, B  4x20\" lat TRX   2x10 SL shuttle press, 75 lbs PN see above     Neuro Re-Ed:  Dead bug with UE bias 3 sets 30s  Multifidus walks 2 x 5 ea O band     2x7 SL bridges  Dead bug with UE bias 2 sets 30s Neuro Re-Ed:  Bird dog 8 ea alt, 2 sets   Dead bug with opposing UE 4.5# 2 sets to tired  15x multifidi back ext  prone off EOM.  2x10 shelf position TA brace c shoulder full shld flex holding 4 lb med ball Manual Therapy(10')  Prone lumbar mutifidi STM, SL rot and flex for assessment  Prone C PA T10-TL2 G3 CT junction mob    Manual:  Prone lumbar mutifidi STM, 6 min  Prone C PA T10-T12 G3  Manual:  Prone lumbar mutifidi STM, 6 min  Prone C PA T10-T12 G3  Manual Therapy(9')  Prone lumbar mutifidi STM,  Prone C PA T10-T12 G3    Manual Therapy(9')  Prone lumbar mutifidi STM,  Prone C PA T10-T12 G3  15x hamstring bridge red SB  2x10 ham bridges, red SB   Manual Therapy(9')  Prone lumbar mutifidi STM, SL rot and flex for assessment  Prone C PA T10-TL2 G3 10x squat lifts 25 lbs in crate  10x lunge to bosu with rotation 6 lb med ball  3x20\" lat stretch on TRX  15x squats to row 45 lbs   HEP: : 2x10 pelvic tilts, 15x TA brace, 2x30\" hamstring stretch, 15x sciatic n glides, then adductor bias glides with floss x10 min, single leg heel raise    Charges: manual:1, therex:2 Total Timed Treatment: 41 min  Total Treatment Time: 41 min

## 2023-12-06 ENCOUNTER — OFFICE VISIT (OUTPATIENT)
Dept: PHYSICAL THERAPY | Facility: HOSPITAL | Age: 33
End: 2023-12-06
Attending: FAMILY MEDICINE
Payer: COMMERCIAL

## 2023-12-06 PROCEDURE — 97110 THERAPEUTIC EXERCISES: CPT

## 2023-12-06 PROCEDURE — 97140 MANUAL THERAPY 1/> REGIONS: CPT

## 2023-12-06 NOTE — PROGRESS NOTES
Diagnosis:     S/P lumbar microdiscectomy (Q58.851)    Referring Provider: Kristen Vazquez  Date of Evaluation:    10/11/2023    Precautions:   no lifting over 20 lbs s/p L5 microdisectomy on Sept 6, 2023. Next MD visit:   none scheduled  Date of Surgery: Sept 6, 2023. Insurance Primary/Secondary: [employer] [place] HMO / N/A     # Auth Visits: 10 authorized            Subjective: Mat states he is doing well functionally, cont to have numbness in back of thigh and R foot. Pain:  Back pain:  none at present, when sitting down can feel intermittently 2/10 and into posterior thigh     Foot numbness: 1/10       Objective:   P1: posterior thigh  P2: back (upper lumbar) (not hurting currently, just with flexion)  P3: numbness is better in R foot, but cotninues(some paresthesia with leg elevated which feels like relief then goes back to numb)  Current functional limitations include: bending, lifting, sitting, longer car rides, exercise right now (as post op). Sleep is ok. AROM: (* denotes performed with pain)  Flexion:62 deg (improved from 55 deg) with stretch in back R thigh   Extension: 7 deg; min limited (hinges higher at T11)  Sidebending: B to 1\" past knee joint line   Rotation: R min to nil limited (tightness); L nil limited (tightness)    SLR:   R: 50 deg (increased from 44 )(relief with plantar flexion)   L : 65 deg improved from 53 deg    Single leg heel raise: R 14 x (improved from 9); L 20      Strength: (* denotes performed with pain)  LE   Hip flexion (L2): R 5/5; L 5/5  Hip abduction: R NT/5; L NT/5  Hip Extension: R NT/5; L NT/5            Hip ER: R 5/5; L 5/5  Hip IR: R NT/5; L NT/5  Knee Flexion: R 5/5; L 5/5            Knee extension (L3): R 5/5; L 5/5            DF (L4): R 5/5; L 5/5  Great Toe Ext (L5): R 5/5, L 5/5  PF (S1): R 4-/5; L 5/5         Assessment:  Continuing to progress functional mobility c backwrds lunges to row and goblet squats with 15 lbs. Good form noted and denies pain throughout.  Cont to mobilize low back to improved extension mobility as he is hypomobile in mid lumbar. Goals:   (to be met in 13 visits)   Pt will improve transversus abdominis recruitment to perform proper isometric contraction without requiring verbal or tactile cuing to promote advancement of therex (MET)  Pt will demonstrate good understanding of proper posture and body mechanics to decrease pain and improve spinal safety   Pt will improve lumbar spine AROM ext to >10 deg to allow increase ease withstanding for 30 min at a time (in progress)  Pt will report improved symptom centralization and absence of radicular symptoms for 3 consecutive days to improve function with ADL (in progress)  Pt will improve SLR to 60 deg on the R to demo decreased neural tension. (Improving)   Pt will have decreased paraspinal mm tension to tolerate standing >40 minutes for work and home activities , MET upgraded to 1 hour   Pt will demonstrate improved core strength to be able to perform 10 squats with <2/10 pain (in progress)  Pt will be independent and compliant with comprehensive HEP to maintain progress achieved in PT. Progressing. Oswestry Disability Index Score  Score: 78 % (7/19/2023 10:49 AM)    Post Oswestry Disability Index Score  Post Score: 10 % (11/20/2023 11:57 AM)    68 % improvement  Post Oswestry Disability Index Score  Post Score: 10 % (11/20/2023 11:57 AM)    68 % improvement    Plan: Attempt sensory e-stim next time if available.    Date:  10/27/23               TX#: 5/ Date: 10/30/2023   Tx#: 6/15 11/1/2023  7/15 Date:11/6/2023   Tx#:8/15 Date:11/13/2023   Tx#:9/15 Date:11/20/2023   Tx#:10/15 Date:12/6/2023   Tx#:11/15   Therapeutic Exercise:   LYNSEY and re-assessment   Sciatic nerve glide correction 10x   TRX squats 2 x 15  Open book R 10 x 5s   LBW GTB x 2 laps 12'  Therex:  5x lumbar ext with bias R  15x TRX squats  2x10 hip abd in SLS with blue T-band Therapeutic Exercise:   15x2 TRX squats  Monster walks GTB 3 laps 2 feet, forward/retro   Quadruped ext press 2 x 10 ea, 3 bands Therex:  2x15 split squats at chair  2x10 rotational press, 20 lbs  Seated at edge of mat table for slump sciatic n glide on R. Therex:  2x10 lat pull down, 70 lbs Therex:  10x segmental lumbar flex seated  10x thoracic ext over foam roll with OP Therex:  2x10 R SL calf raises  2x10 B calf raises on slantboard for quad contraction  3x30\" slantboard stretch    15x chops, 20 lbs  2x10 lumbar ext over mat table, support at legs  2x10 glute ext shuttle press, 37 lbs, B Squat chop 4# ball x 8 ea   Active rest levon pose 2 x 20s  2x10 goblet squats, 7 lb med ball  2x10 backwrds walkouts, 35 lbs 10x squat to lift diagonal PNF, 8 lb ball, B  4x20\" lat TRX   2x10 SL shuttle press, 75 lbs PN see above   2x10 goblet squats with 15 lbs to box  2x8 posterior lunged to row, 30 lbs   Neuro Re-Ed:  Dead bug with UE bias 3 sets 30s  Multifidus walks 2 x 5 ea O band     2x7 SL bridges  Dead bug with UE bias 2 sets 30s Neuro Re-Ed:  Bird dog 8 ea alt, 2 sets   Dead bug with opposing UE 4.5# 2 sets to tired  15x multifidi back ext  prone off EOM.  2x10 shelf position TA brace c shoulder full shld flex holding 4 lb med ball Manual Therapy(10')  Prone lumbar mutifidi STM, SL rot and flex for assessment  Prone C PA T10-TL2 G3 CT junction mob  2x10 lat pulldowns,   80 lbs  2x10 low rows, 35 lbs, B  2x8 multifidi ext off edge of mat   Manual:  Prone lumbar mutifidi STM, 6 min  Prone C PA T10-T12 G3  Manual:  Prone lumbar mutifidi STM, 6 min  Prone C PA T10-T12 G3  Manual Therapy(9')  Prone lumbar mutifidi STM,  Prone C PA T10-T12 G3    Manual Therapy(9')  Prone lumbar mutifidi STM,  Prone C PA T10-T12 G3  15x hamstring bridge red SB  2x10 ham bridges, red SB   Manual Therapy(9')  Prone lumbar mutifidi STM, SL rot and flex for assessment  Prone C PA T10-TL2 G3 10x squat lifts 25 lbs in crate  10x lunge to bosu with rotation 6 lb med ball  3x20\" lat stretch on TRX  15x squats to row 45 lbs Manual Therapy(9')  Prone lumbar mutifidi STM, SL rot and flex for assessment    Prone GR III CPA T12-L2   HEP: : 2x10 pelvic tilts, 15x TA brace, 2x30\" hamstring stretch, 15x sciatic n glides, then adductor bias glides with floss x10 min, single leg heel raise    Charges: manual:1, therex:2 Total Timed Treatment: 41 min  Total Treatment Time: 41 min

## 2023-12-11 ENCOUNTER — TELEPHONE (OUTPATIENT)
Dept: PHYSICAL THERAPY | Facility: HOSPITAL | Age: 33
End: 2023-12-11

## 2023-12-13 ENCOUNTER — OFFICE VISIT (OUTPATIENT)
Dept: PHYSICAL THERAPY | Facility: HOSPITAL | Age: 33
End: 2023-12-13
Attending: FAMILY MEDICINE
Payer: COMMERCIAL

## 2023-12-13 PROCEDURE — 97110 THERAPEUTIC EXERCISES: CPT

## 2023-12-13 PROCEDURE — 97140 MANUAL THERAPY 1/> REGIONS: CPT

## 2023-12-13 NOTE — PROGRESS NOTES
Diagnosis:     S/P lumbar microdiscectomy (Q48.614)    Referring Provider: Ceci Foley  Date of Evaluation:    10/11/2023    Precautions:   no lifting over 20 lbs s/p L5 microdisectomy on Sept 6, 2023. Next MD visit:   none scheduled  Date of Surgery: Sept 6, 2023. Insurance Primary/Secondary: [employer] HMO / N/A     # Auth Visits: 10 authorized            Subjective: Mat reports he is feeling good today, he feels his core is stronger. He feels he is getting out of bed easier. Tingling in bottom of R foot is still the same. Pain:  Back pain:  none at present, when sitting down can feel intermittently 2/10 and into posterior thigh     Foot numbness: 1/10       Objective:   P1: posterior thigh  P2: back (upper lumbar) (not hurting currently, just with flexion)  P3: numbness is better in R foot, but cotninues(some paresthesia with leg elevated which feels like relief then goes back to numb)  Current functional limitations include: bending, lifting, sitting, longer car rides, exercise right now (as post op). Sleep is ok.       AROM: (* denotes performed with pain)  Flexion:62 deg (improved from 55 deg) with stretch in back R thigh   Extension: 7 deg; min limited (hinges higher at T11)  Sidebending: B to 1\" past knee joint line   Rotation: R min to nil limited (tightness); L nil limited (tightness)    SLR:   R: 50 deg (increased from 44 )(relief with plantar flexion)   L : 65 deg improved from 53 deg    Single leg heel raise: R 14 x (improved from 9); L 20      Strength: (* denotes performed with pain)  LE   Hip flexion (L2): R 5/5; L 5/5  Hip abduction: R NT/5; L NT/5  Hip Extension: R NT/5; L NT/5            Hip ER: R 5/5; L 5/5  Hip IR: R NT/5; L NT/5  Knee Flexion: R 5/5; L 5/5            Knee extension (L3): R 5/5; L 5/5            DF (L4): R 5/5; L 5/5  Great Toe Ext (L5): R 5/5, L 5/5  PF (S1): R 4-/5; L 5/5         Assessment:  Progressed lumbopelivic strength with [place] get ups and planks with IR/ to ER hip flexion. Did partial get ups as full stand would be difficult with 15 lbs. Will continue to progress abdominal exercises. Will attempt estim on R foot for sensory return. Goals:   (to be met in 13 visits)   Pt will improve transversus abdominis recruitment to perform proper isometric contraction without requiring verbal or tactile cuing to promote advancement of therex (MET)  Pt will demonstrate good understanding of proper posture and body mechanics to decrease pain and improve spinal safety   Pt will improve lumbar spine AROM ext to >10 deg to allow increase ease withstanding for 30 min at a time (in progress)  Pt will report improved symptom centralization and absence of radicular symptoms for 3 consecutive days to improve function with ADL (in progress)  Pt will improve SLR to 60 deg on the R to demo decreased neural tension. (Improving)   Pt will have decreased paraspinal mm tension to tolerate standing >40 minutes for work and home activities , MET upgraded to 1 hour   Pt will demonstrate improved core strength to be able to perform 10 squats with <2/10 pain (in progress)  Pt will be independent and compliant with comprehensive HEP to maintain progress achieved in PT. Progressing. Oswestry Disability Index Score  Score: 78 % (7/19/2023 10:49 AM)    Post Oswestry Disability Index Score  Post Score: 10 % (11/20/2023 11:57 AM)    68 % improvement  Post Oswestry Disability Index Score  Post Score: 10 % (11/20/2023 11:57 AM)    68 % improvement    Plan: Attempt sensory e-stim next time if available.    Date:11/13/2023   Tx#:9/15 Date:11/20/2023   Tx#:10/15 Date:12/6/2023   Tx#:11/15 Date:12/13/2023   Tx#:12/15   Therex:  2x10 lat pull down, 70 lbs Therex:  10x segmental lumbar flex seated  10x thoracic ext over foam roll with OP Therex:  2x10 R SL calf raises  2x10 B calf raises on slantboard for quad contraction  3x30\" slantboard stretch Neuro re-ed:  15x sciatic nerve glide in slump position (every other adductor bias)   10x squat to lift diagonal PNF, 8 lb ball, B  4x20\" lat TRX   2x10 SL shuttle press, 75 lbs PN see above   2x10 goblet squats with 15 lbs to box  2x8 posterior lunged to row, 30 lbs 2x10 plank onto bosu with hip flex and IR.  2x10 supermans over bosu   2x10 shelf position TA brace c shoulder full shld flex holding 4 lb med ball Manual Therapy(10')  Prone lumbar mutifidi STM, SL rot and flex for assessment  Prone C PA T10-TL2 G3 CT junction mob  2x10 lat pulldowns,   80 lbs  2x10 low rows, 35 lbs, B  2x8 multifidi ext off edge of mat 2x10 bridges with feet on bosu  2x8 Welsh get up, 15 lbs, b (mod not to full standing)   15x hamstring bridge red SB  2x10 ham bridges, red SB   Manual Therapy(9')  Prone lumbar mutifidi STM, SL rot and flex for assessment  Prone C PA T10-TL2 G3 10x squat lifts 25 lbs in crate  10x lunge to bosu with rotation 6 lb med ball  3x20\" lat stretch on TRX  15x squats to row 45 lbs Manual Therapy(9')  Prone lumbar mutifidi STM, SL rot and flex for assessment    Prone GR III CPA T12-L2 Manual therapy:  Prone GR III CPA T12-L2, effleurage to lumbar paraspinals  X10 min   HEP: : 2x10 pelvic tilts, 15x TA brace, 2x30\" hamstring stretch, 15x sciatic n glides, then adductor bias glides with floss x10 min, single leg heel raise    Charges: manual:1, therex:2 Total Timed Treatment: 41 min  Total Treatment Time: 41 min

## 2023-12-20 ENCOUNTER — TELEPHONE (OUTPATIENT)
Dept: PHYSICAL THERAPY | Facility: HOSPITAL | Age: 33
End: 2023-12-20

## 2023-12-20 ENCOUNTER — APPOINTMENT (OUTPATIENT)
Dept: PHYSICAL THERAPY | Facility: HOSPITAL | Age: 33
End: 2023-12-20
Attending: FAMILY MEDICINE
Payer: COMMERCIAL

## 2023-12-20 NOTE — PROGRESS NOTES
Diagnosis:     S/P lumbar microdiscectomy (E48.133)    Referring Provider: Emma Gaines  Date of Evaluation:    10/11/2023    Precautions:   no lifting over 20 lbs s/p L5 microdisectomy on Sept 6, 2023. Next MD visit:   none scheduled  Date of Surgery: Sept 6, 2023. Insurance Primary/Secondary: [employer] [place] HMO / N/A     # Auth Visits: 10 authorized            Subjective: Mat***  Pain:  Back pain:  none at present, when sitting down can feel intermittently 2/10 and into posterior thigh     Foot numbness: 1/10       Objective:   P1: posterior thigh  P2: back (upper lumbar) (not hurting currently, just with flexion)  P3: numbness is better in R foot, but cotninues(some paresthesia with leg elevated which feels like relief then goes back to numb)  Current functional limitations include: bending, lifting, sitting, longer car rides, exercise right now (as post op). Sleep is ok.       AROM: (* denotes performed with pain)  Flexion:62 deg (improved from 55 deg) with stretch in back R thigh   Extension: 7 deg; min limited (hinges higher at T11)  Sidebending: B to 1\" past knee joint line   Rotation: R min to nil limited (tightness); L nil limited (tightness)    SLR:   R: 50 deg (increased from 44 )(relief with plantar flexion)   L : 65 deg improved from 53 deg    Single leg heel raise: R 14 x (improved from 9); L 20      Strength: (* denotes performed with pain)  LE   Hip flexion (L2): R 5/5; L 5/5  Hip abduction: R NT/5; L NT/5  Hip Extension: R NT/5; L NT/5            Hip ER: R 5/5; L 5/5  Hip IR: R NT/5; L NT/5  Knee Flexion: R 5/5; L 5/5            Knee extension (L3): R 5/5; L 5/5            DF (L4): R 5/5; L 5/5  Great Toe Ext (L5): R 5/5, L 5/5  PF (S1): R 4-/5; L 5/5         Assessment:  Progre***    Goals:   (to be met in 13 visits)   Pt will improve transversus abdominis recruitment to perform proper isometric contraction without requiring verbal or tactile cuing to promote advancement of therex (MET)  Pt will demonstrate good understanding of proper posture and body mechanics to decrease pain and improve spinal safety   Pt will improve lumbar spine AROM ext to >10 deg to allow increase ease withstanding for 30 min at a time (in progress)  Pt will report improved symptom centralization and absence of radicular symptoms for 3 consecutive days to improve function with ADL (in progress)  Pt will improve SLR to 60 deg on the R to demo decreased neural tension. (Improving)   Pt will have decreased paraspinal mm tension to tolerate standing >40 minutes for work and home activities , MET upgraded to 1 hour   Pt will demonstrate improved core strength to be able to perform 10 squats with <2/10 pain (in progress)  Pt will be independent and compliant with comprehensive HEP to maintain progress achieved in PT. Progressing. Oswestry Disability Index Score  Score: 78 % (7/19/2023 10:49 AM)    Post Oswestry Disability Index Score  Post Score: 10 % (11/20/2023 11:57 AM)    68 % improvement  Post Oswestry Disability Index Score  Post Score: 10 % (11/20/2023 11:57 AM)    68 % improvement    Plan: Attempt sensory e-stim next time if available.    Date:11/13/2023   Tx#:9/15 Date:11/20/2023   Tx#:10/15 Date:12/6/2023   Tx#:11/15 Date:12/13/2023   Tx#:12/15 Date:12/20/2023   Tx#:13/15   Therex:  2x10 lat pull down, 70 lbs Therex:  10x segmental lumbar flex seated  10x thoracic ext over foam roll with OP Therex:  2x10 R SL calf raises  2x10 B calf raises on slantboard for quad contraction  3x30\" slantboard stretch Neuro re-ed:  15x sciatic nerve glide in slump position (every other adductor bias)    10x squat to lift diagonal PNF, 8 lb ball, B  4x20\" lat TRX   2x10 SL shuttle press, 75 lbs PN see above   2x10 goblet squats with 15 lbs to box  2x8 posterior lunged to row, 30 lbs 2x10 plank onto bosu with hip flex and IR.  2x10 supermans over bosu    2x10 shelf position TA brace c shoulder full shld flex holding 4 lb med ball Manual Therapy(10')  Prone lumbar mutifidi STM, SL rot and flex for assessment  Prone C PA T10-TL2 G3 CT junction mob  2x10 lat pulldowns,   80 lbs  2x10 low rows, 35 lbs, B  2x8 multifidi ext off edge of mat 2x10 bridges with feet on bosu  2x8 [place]ese get up, 15 lbs, b (mod not to full standing)    15x hamstring bridge red SB  2x10 ham bridges, red SB   Manual Therapy(9')  Prone lumbar mutifidi STM, SL rot and flex for assessment  Prone C PA T10-TL2 G3 10x squat lifts 25 lbs in crate  10x lunge to bosu with rotation 6 lb med ball  3x20\" lat stretch on TRX  15x squats to row 45 lbs Manual Therapy(9')  Prone lumbar mutifidi STM, SL rot and flex for assessment    Prone GR III CPA T12-L2 Manual therapy:  Prone GR III CPA T12-L2, effleurage to lumbar paraspinals  X10 min    HEP: : 2x10 pelvic tilts, 15x TA brace, 2x30\" hamstring stretch, 15x sciatic n glides, then adductor bias glides with floss x10 min, single leg heel raise    Charges: manual:1, therex:2 Total Timed Treatment: 41 min  Total Treatment Time: 41 min

## 2023-12-27 ENCOUNTER — OFFICE VISIT (OUTPATIENT)
Dept: PHYSICAL THERAPY | Facility: HOSPITAL | Age: 33
End: 2023-12-27
Attending: FAMILY MEDICINE
Payer: COMMERCIAL

## 2023-12-27 PROCEDURE — 97140 MANUAL THERAPY 1/> REGIONS: CPT

## 2023-12-27 PROCEDURE — 97110 THERAPEUTIC EXERCISES: CPT

## 2023-12-27 NOTE — PROGRESS NOTES
Diagnosis:     S/P lumbar microdiscectomy (T76.098)    Referring Provider: Charisse Tinoco  Date of Evaluation:    10/11/2023    Precautions:   no lifting over 20 lbs s/p L5 microdisectomy on Sept 6, 2023. Next MD visit:   none scheduled  Date of Surgery: Sept 6, 2023. Insurance Primary/Secondary: [employer] [place] [facility] HMO / N/A     # Auth Visits: 10 authorized            Subjective: Farhat continues to have numbness in his R foot. Pain:  Back pain:  none at present, when sitting down can feel intermittently 2/10 and into posterior thigh     Foot numbness: 1/10       Objective:   P1: posterior thigh  P2: back (upper lumbar) (not hurting currently, just with flexion)  P3: numbness is better in R foot, but cotninues(some paresthesia with leg elevated which feels like relief then goes back to numb)  Current functional limitations include: bending, lifting, sitting, longer car rides, exercise right now (as post op). Sleep is ok. AROM: (* denotes performed with pain)  Flexion:62 deg (improved from 55 deg) with stretch in back R thigh   Extension: 7 deg; min limited (hinges higher at T11)  Sidebending: B to 1\" past knee joint line   Rotation: R min to nil limited (tightness); L nil limited (tightness)    SLR:   R: 50 deg (increased from 44 )(relief with plantar flexion)   L : 65 deg improved from 53 deg    Single leg heel raise: R 14 x (improved from 9); L 20      Strength: (* denotes performed with pain)  LE   Hip flexion (L2): R 5/5; L 5/5  Hip abduction: R NT/5; L NT/5  Hip Extension: R NT/5; L NT/5            Hip ER: R 5/5; L 5/5  Hip IR: R NT/5; L NT/5  Knee Flexion: R 5/5; L 5/5            Knee extension (L3): R 5/5; L 5/5            DF (L4): R 5/5; L 5/5  Great Toe Ext (L5): R 5/5, L 5/5  PF (S1): R 4-/5; L 5/5         Assessment:  Added NMES to improve sensation to the bottom of the R foot. He tolerated this well, increased parameters past sensory level to be more aggressive.  Good tolerance towards all exercises with good form and progression with weights. Goals:   (to be met in 13 visits)   Pt will improve transversus abdominis recruitment to perform proper isometric contraction without requiring verbal or tactile cuing to promote advancement of therex (MET)  Pt will demonstrate good understanding of proper posture and body mechanics to decrease pain and improve spinal safety   Pt will improve lumbar spine AROM ext to >10 deg to allow increase ease withstanding for 30 min at a time (in progress)  Pt will report improved symptom centralization and absence of radicular symptoms for 3 consecutive days to improve function with ADL (in progress)  Pt will improve SLR to 60 deg on the R to demo decreased neural tension. (Improving)   Pt will have decreased paraspinal mm tension to tolerate standing >40 minutes for work and home activities , MET upgraded to 1 hour   Pt will demonstrate improved core strength to be able to perform 10 squats with <2/10 pain (in progress)  Pt will be independent and compliant with comprehensive HEP to maintain progress achieved in PT. Progressing. 68 % improvement  Post Oswestry Disability Index Score  Post Score: 10 % (11/20/2023 11:57 AM)    68 % improvement    Plan: Attempt sensory e-stim next time if available.    Date:11/13/2023   Tx#:9/15 Date:11/20/2023   Tx#:10/15 Date:12/6/2023   Tx#:11/15 Date:12/13/2023   Tx#:12/15 Date:12/27/2023   Tx#:13/15   Therex:  2x10 lat pull down, 70 lbs Therex:  10x segmental lumbar flex seated  10x thoracic ext over foam roll with OP Therex:  2x10 R SL calf raises  2x10 B calf raises on slantboard for quad contraction  3x30\" slantboard stretch Neuro re-ed:  15x sciatic nerve glide in slump position (every other adductor bias) Therex:  2x10 1/2 half tall kneel chop, 40 lbs, B  2x15 rotational press, 30 lbs   10x squat to lift diagonal PNF, 8 lb ball, B  4x20\" lat TRX   2x10 SL shuttle press, 75 lbs PN see above   2x10 goblet squats with 15 lbs to box  2x8 posterior lunged to row, 30 lbs 2x10 plank onto bosu with hip flex and IR.  2x10 supermans over bosu NMEs on the bottom lateral surface of R foot (350 usec, 70 hz, 23 mA) for aggressive sensory stim throughout session, 20\"/8\" off   2x10 shelf position TA brace c shoulder full shld flex holding 4 lb med ball Manual Therapy(10')  Prone lumbar mutifidi STM, SL rot and flex for assessment  Prone C PA T10-TL2 G3 CT junction mob  2x10 lat pulldowns,   80 lbs  2x10 low rows, 35 lbs, B  2x8 multifidi ext off edge of mat 2x10 bridges with feet on bosu  2x8 [place] get up, 15 lbs, b (mod not to full standing) 2x10 plank onto bosu with hip flex and IR.  2x10 supermans over bosu   15x hamstring bridge red SB  2x10 ham bridges, red SB   Manual Therapy(9')  Prone lumbar mutifidi STM, SL rot and flex for assessment  Prone C PA T10-TL2 G3 10x squat lifts 25 lbs in crate  10x lunge to bosu with rotation 6 lb med ball  3x20\" lat stretch on TRX  15x squats to row 45 lbs Manual Therapy(9')  Prone lumbar mutifidi STM, SL rot and flex for assessment    Prone GR III CPA T12-L2 Manual therapy:  Prone GR III CPA T12-L2, effleurage to lumbar paraspinals  X10 min 2x10 goblet squats with 15 lbs to bosu to mid chest lift    Manual therapy:  Prone GR III CPA T12-L2, effleurage to lumbar paraspinals  X10 min   HEP: : 2x10 pelvic tilts, 15x TA brace, 2x30\" hamstring stretch, 15x sciatic n glides, then adductor bias glides with floss x10 min, single leg heel raise    Charges: manual:1, therex:2 Total Timed Treatment: 41 min  Total Treatment Time: 41 min

## 2024-01-10 ENCOUNTER — OFFICE VISIT (OUTPATIENT)
Dept: PHYSICAL THERAPY | Facility: HOSPITAL | Age: 34
End: 2024-01-10
Attending: FAMILY MEDICINE
Payer: COMMERCIAL

## 2024-01-10 PROCEDURE — 97110 THERAPEUTIC EXERCISES: CPT

## 2024-01-10 PROCEDURE — 97140 MANUAL THERAPY 1/> REGIONS: CPT

## 2024-01-10 NOTE — PROGRESS NOTES
Diagnosis:     S/P lumbar microdiscectomy (Z98.890)    Referring Provider: Nena  Date of Evaluation:    10/11/2023    Precautions:   no lifting over 20 lbs s/p L5 microdisectomy on Sept 6, 2023.  Next MD visit:   none scheduled  Date of Surgery: Sept 6, 2023.    Insurance Primary/Secondary: [employer] [place] HMO / N/A     # Auth Visits: 10 authorized            Subjective: Mat reports some soreness in his low back from shoveling snow. Continued  numbness in his foot.   Pain:  Back pain:  none at present, when sitting down can feel intermittently 2/10 and into posterior thigh     Foot numbness: 1/10       Objective:   P1: posterior thigh  P2: back (upper lumbar) (not hurting currently, just with flexion)  P3: numbness is better in R foot, but cotninues(some paresthesia with leg elevated which feels like relief then goes back to numb)  Current functional limitations include: bending, lifting, sitting, longer car rides, exercise right now (as post op). Sleep is ok.      AROM: (* denotes performed with pain)  Flexion:62 deg (improved from 55 deg) with stretch in back R thigh   Extension: 7 deg; min limited (hinges higher at T11)  Sidebending: B to 1\" past knee joint line   Rotation: R min to nil limited (tightness); L nil limited (tightness)    SLR:   R: 50 deg (increased from 44 )(relief with plantar flexion)   L : 65 deg improved from 53 deg    Single leg heel raise: R 14 x (improved from 9); L 20      Strength: (* denotes performed with pain)  LE   Hip flexion (L2): R 5/5; L 5/5  Hip abduction: R NT/5; L NT/5  Hip Extension: R NT/5; L NT/5            Hip ER: R 5/5; L 5/5  Hip IR: R NT/5; L NT/5  Knee Flexion: R 5/5; L 5/5            Knee extension (L3): R 5/5; L 5/5            DF (L4): R 5/5; L 5/5  Great Toe Ext (L5): R 5/5, L 5/5  PF (S1): R 4-/5; L 5/5         Assessment:  Continuing to progress lumbar paraspinal strength with resisted back extensions and with resisted bird dogs. He was able to perform [place] get  ups with increased resistance too. Good tolerance to all progressions, felt the lower frequency stim was stronger and he could tolerate it well.    Goals:   (to be met in 13 visits)   Pt will improve transversus abdominis recruitment to perform proper isometric contraction without requiring verbal or tactile cuing to promote advancement of therex (MET)  Pt will demonstrate good understanding of proper posture and body mechanics to decrease pain and improve spinal safety   Pt will improve lumbar spine AROM ext to >10 deg to allow increase ease withstanding for 30 min at a time (in progress)  Pt will report improved symptom centralization and absence of radicular symptoms for 3 consecutive days to improve function with ADL (in progress)  Pt will improve SLR to 60 deg on the R to demo decreased neural tension. (Improving)   Pt will have decreased paraspinal mm tension to tolerate standing >40 minutes for work and home activities , MET upgraded to 1 hour   Pt will demonstrate improved core strength to be able to perform 10 squats with <2/10 pain (in progress)  Pt will be independent and compliant with comprehensive HEP to maintain progress achieved in PT. Progressing.        68 % improvement  Post Oswestry Disability Index Score  Post Score: 10 % (11/20/2023 11:57 AM)    68 % improvement    Plan: Attempt sensory e-stim next time if available.   Date:11/13/2023   Tx#:9/15 Date:11/20/2023   Tx#:10/15 Date:12/6/2023   Tx#:11/15 Date:12/13/2023   Tx#:12/15 Date:12/27/2023   Tx#:13/15 Date:1/10/2024   Tx#:14/15   Therex:  2x10 lat pull down, 70 lbs Therex:  10x segmental lumbar flex seated  10x thoracic ext over foam roll with OP Therex:  2x10 R SL calf raises  2x10 B calf raises on slantboard for quad contraction  3x30\" slantboard stretch Neuro re-ed:  15x sciatic nerve glide in slump position (every other adductor bias) Therex:  2x10 1/2 half tall kneel chop, 40 lbs, B  2x15 rotational press, 30 lbs Therex;  15x standing  chop, 40 lbs, B   10x squat to lift diagonal PNF, 8 lb ball, B  4x20\" lat TRX   2x10 SL shuttle press, 75 lbs PN see above   2x10 goblet squats with 15 lbs to box  2x8 posterior lunged to row, 30 lbs 2x10 plank onto bosu with hip flex and IR.  2x10 supermans over bosu NMEs on the bottom lateral surface of R foot (350 usec, 70 hz, 23 mA) for aggressive sensory stim throughout session, 20\"/8\" off NMEs on the bottom lateral surface of R foot (300 usec, 15 hz, 23 mA) for aggressive sensory stim throughout session, 15\"/5\" off   2x10 shelf position TA brace c shoulder full shld flex holding 4 lb med ball Manual Therapy(10')  Prone lumbar mutifidi STM, SL rot and flex for assessment  Prone C PA T10-TL2 G3 CT junction mob  2x10 lat pulldowns,   80 lbs  2x10 low rows, 35 lbs, B  2x8 multifidi ext off edge of mat 2x10 bridges with feet on bosu  2x8 [place] get up, 15 lbs, b (mod not to full standing) 2x10 plank onto bosu with hip flex and IR.  2x10 supermans over bosu 2x10 lumbar extension off edge of mat holding 10 lbs  2x10 quadruped bird/dogs, holding 5 lbs each hand   15x hamstring bridge red SB  2x10 ham bridges, red SB   Manual Therapy(9')  Prone lumbar mutifidi STM, SL rot and flex for assessment  Prone C PA T10-TL2 G3 10x squat lifts 25 lbs in crate  10x lunge to bosu with rotation 6 lb med ball  3x20\" lat stretch on TRX  15x squats to row 45 lbs Manual Therapy(9')  Prone lumbar mutifidi STM, SL rot and flex for assessment    Prone GR III CPA T12-L2 Manual therapy:  Prone GR III CPA T12-L2, effleurage to lumbar paraspinals  X10 min 2x10 goblet squats with 15 lbs to bosu to mid chest lift    Manual therapy:  Prone GR III CPA T12-L2, effleurage to lumbar paraspinals  X10 min 15x mod [place] get ups, 8 lbs   2x10 prone hip ext, 4 lbs  15x quadruped hip ext on shuttle 50 lbs        Manual therapy:  Prone GR III CPA T12-L2, effleurage to lumbar paraspinals  X10 min   HEP: : 2x10 pelvic tilts, 15x TA brace, 2x30\" hamstring  stretch, 15x sciatic n glides, then adductor bias glides with floss x10 min, single leg heel raise    Charges: manual:1, therex:2 Total Timed Treatment: 41 min  Total Treatment Time: 41 min

## 2024-01-24 ENCOUNTER — OFFICE VISIT (OUTPATIENT)
Dept: PHYSICAL THERAPY | Facility: HOSPITAL | Age: 34
End: 2024-01-24
Attending: FAMILY MEDICINE
Payer: COMMERCIAL

## 2024-01-24 PROCEDURE — 97140 MANUAL THERAPY 1/> REGIONS: CPT

## 2024-01-24 PROCEDURE — 97110 THERAPEUTIC EXERCISES: CPT

## 2024-01-24 NOTE — PROGRESS NOTES
Diagnosis:     S/P lumbar microdiscectomy (Z98.890)    Referring Provider: Nena  Date of Evaluation:    10/11/2023    Precautions:   no lifting over 20 lbs s/p L5 microdisectomy on Sept 6, 2023.  Next MD visit:   none scheduled  Date of Surgery: Sept 6, 2023.    Insurance Primary/Secondary: [employer] [place] HMO / N/A     # Auth Visits: 18 authorized     (5 additional from start of new year)       Subjective: Mat reports continued mm pain in standing or sitting. He does not feel it in supine. He also has cont numbness in R foot (lateral side)  Pain:  Back pain:  1-2/10    Foot numbness: 1/10       Objective:   P1: posterior thigh  P2: back (upper lumbar) (not hurting currently, just with flexion)  P3: numbness is better in R foot, but cotninues(some paresthesia with leg elevated which feels like relief then goes back to numb)  Current functional limitations include: bending, lifting, sitting, longer car rides, exercise right now (as post op). Sleep is ok.      AROM: (* denotes performed with pain)  Flexion:62 deg (improved from 55 deg) with stretch in back R thigh   Extension: 7 deg; min limited (hinges higher at T11)  Sidebending: B to 1\" past knee joint line   Rotation: R min to nil limited (tightness); L nil limited (tightness)    SLR:   R: 50 deg (increased from 44 )(relief with plantar flexion)   L : 65 deg improved from 53 deg    Single leg heel raise: R 14 x (improved from 9); L 20      Strength: (* denotes performed with pain)  LE   Hip flexion (L2): R 5/5; L 5/5  Hip abduction: R NT/5; L NT/5  Hip Extension: R NT/5; L NT/5            Hip ER: R 5/5; L 5/5  Hip IR: R NT/5; L NT/5  Knee Flexion: R 5/5; L 5/5            Knee extension (L3): R 5/5; L 5/5            DF (L4): R 5/5; L 5/5  Great Toe Ext (L5): R 5/5, L 5/5  PF (S1): R 4-/5; L 5/5         Assessment:  Started session with lower level stretching into extension. Attempting OP T10 and L4 with press ups to improve mobility. No major pain during session  with exercise. Will re-assess neural tension next time.     Goals:   (to be met in 13 visits)   Pt will improve transversus abdominis recruitment to perform proper isometric contraction without requiring verbal or tactile cuing to promote advancement of therex (MET)  Pt will demonstrate good understanding of proper posture and body mechanics to decrease pain and improve spinal safety   Pt will improve lumbar spine AROM ext to >10 deg to allow increase ease withstanding for 30 min at a time (in progress)  Pt will report improved symptom centralization and absence of radicular symptoms for 3 consecutive days to improve function with ADL (in progress)  Pt will improve SLR to 60 deg on the R to demo decreased neural tension. (Improving)   Pt will have decreased paraspinal mm tension to tolerate standing >40 minutes for work and home activities , MET upgraded to 1 hour   Pt will demonstrate improved core strength to be able to perform 10 squats with <2/10 pain (in progress)  Pt will be independent and compliant with comprehensive HEP to maintain progress achieved in PT. Progressing.        68 % improvement  Post Oswestry Disability Index Score  Post Score: 10 % (11/20/2023 11:57 AM)    68 % improvement    Plan: Re-test SLR and slump next time.   Date:11/13/2023   Tx#:9/15 Date:11/20/2023   Tx#:10/15 Date:12/6/2023   Tx#:11/15 Date:12/13/2023   Tx#:12/15 Date:12/27/2023   Tx#:13/15 Date:1/10/2024   Tx#:14/15 Date:1/24/2024   Tx#:15/18   Therex:  2x10 lat pull down, 70 lbs Therex:  10x segmental lumbar flex seated  10x thoracic ext over foam roll with OP Therex:  2x10 R SL calf raises  2x10 B calf raises on slantboard for quad contraction  3x30\" slantboard stretch Neuro re-ed:  15x sciatic nerve glide in slump position (every other adductor bias) Therex:  2x10 1/2 half tall kneel chop, 40 lbs, B  2x15 rotational press, 30 lbs Therex;  15x standing chop, 40 lbs, B Therex;  3x15\" extreme flex/rotate stretch  3x15\" SKTC  stretch followed by sciatic N glide after, B   10x squat to lift diagonal PNF, 8 lb ball, B  4x20\" lat TRX   2x10 SL shuttle press, 75 lbs PN see above   2x10 goblet squats with 15 lbs to box  2x8 posterior lunged to row, 30 lbs 2x10 plank onto bosu with hip flex and IR.  2x10 supermans over bosu NMEs on the bottom lateral surface of R foot (350 usec, 70 hz, 23 mA) for aggressive sensory stim throughout session, 20\"/8\" off NMEs on the bottom lateral surface of R foot (300 usec, 15 hz, 23 mA) for aggressive sensory stim throughout session, 15\"/5\" off 10x prone push up woth OP at L4 then T10  6x press up on elbows with OP at L4   2x10 shelf position TA brace c shoulder full shld flex holding 4 lb med ball Manual Therapy(10')  Prone lumbar mutifidi STM, SL rot and flex for assessment  Prone C PA T10-TL2 G3 CT junction mob  2x10 lat pulldowns,   80 lbs  2x10 low rows, 35 lbs, B  2x8 multifidi ext off edge of mat 2x10 bridges with feet on bosu  2x8 [place] get up, 15 lbs, b (mod not to full standing) 2x10 plank onto bosu with hip flex and IR.  2x10 supermans over bosu 2x10 lumbar extension off edge of mat holding 10 lbs  2x10 quadruped bird/dogs, holding 5 lbs each hand 2x10 shuttle press, 81 lbs  15x SL press, 56 lbs, B   15x hamstring bridge red SB  2x10 ham bridges, red SB   Manual Therapy(9')  Prone lumbar mutifidi STM, SL rot and flex for assessment  Prone C PA T10-TL2 G3 10x squat lifts 25 lbs in crate  10x lunge to bosu with rotation 6 lb med ball  3x20\" lat stretch on TRX  15x squats to row 45 lbs Manual Therapy(9')  Prone lumbar mutifidi STM, SL rot and flex for assessment    Prone GR III CPA T12-L2 Manual therapy:  Prone GR III CPA T12-L2, effleurage to lumbar paraspinals  X10 min 2x10 goblet squats with 15 lbs to bosu to mid chest lift    Manual therapy:  Prone GR III CPA T12-L2, effleurage to lumbar paraspinals  X10 min 15x mod [place] get ups, 8 lbs   2x10 prone hip ext, 4 lbs  15x quadruped hip ext on  shuttle 50 lbs 2x10 squat to row, 40 lbs    Sensory level stim on R foot with exercise.         Manual therapy:  Prone GR III CPA T12-L2, effleurage to lumbar paraspinals  X10 min Manual therapy:  Prone GR III CPA T10 and L3-L4, effleurage to lumbar paraspinals; long axis traction  X10 min   HEP: : 2x10 pelvic tilts, 15x TA brace, 2x30\" hamstring stretch, 15x sciatic n glides, then adductor bias glides with floss x10 min, single leg heel raise    Charges: manual:1, therex:2 Total Timed Treatment: 41 min  Total Treatment Time: 41 min

## 2024-01-31 ENCOUNTER — OFFICE VISIT (OUTPATIENT)
Dept: PHYSICAL THERAPY | Facility: HOSPITAL | Age: 34
End: 2024-01-31
Attending: FAMILY MEDICINE
Payer: COMMERCIAL

## 2024-01-31 PROCEDURE — 97110 THERAPEUTIC EXERCISES: CPT

## 2024-01-31 PROCEDURE — 97140 MANUAL THERAPY 1/> REGIONS: CPT

## 2024-01-31 NOTE — PROGRESS NOTES
Diagnosis:     S/P lumbar microdiscectomy (Z98.890)    Referring Provider: Nena  Date of Evaluation:    10/11/2023    Precautions:   no lifting over 20 lbs s/p L5 microdisectomy on Sept 6, 2023.  Next MD visit:   none scheduled  Date of Surgery: Sept 6, 2023.    Insurance Primary/Secondary: [employer] [place] / N/A     # Auth Visits: 18 authorized     (5 additional from start of new year)       Subjective: Mat reports improved low back pain, but tightness with lumbar flexion.  Pain:  Back pain:  1/10    Foot numbness: 1/10       Objective:   P1: posterior thigh  P2: back (upper lumbar) (not hurting currently, just with flexion)  P3: numbness is better in R foot, but cotninues(some paresthesia with leg elevated which feels like relief then goes back to numb)  Current functional limitations include: bending, lifting, sitting, longer car rides, exercise right now (as post op). Sleep is ok.      AROM: (* denotes performed with pain)  Flexion:62 deg (improved from 55 deg) with stretch in back R thigh   Extension: 7 deg; min limited (hinges higher at T11)  Sidebending: B to 1\" past knee joint line   Rotation: R min to nil limited (tightness); L nil limited (tightness)    SLR:   R: 65 deg improved from 50 deg (increased from 44 )(relief with plantar flexion)   L : 75 deg improved from 65 deg    Single leg heel raise: R 14 x (improved from 9); L 20      Strength: (* denotes performed with pain)  LE   Hip flexion (L2): R 5/5; L 5/5  Hip abduction: R NT/5; L NT/5  Hip Extension: R NT/5; L NT/5            Hip ER: R 5/5; L 5/5  Hip IR: R NT/5; L NT/5  Knee Flexion: R 5/5; L 5/5            Knee extension (L3): R 5/5; L 5/5            DF (L4): R 5/5; L 5/5  Great Toe Ext (L5): R 5/5, L 5/5  PF (S1): R 4-/5; L 5/5         Assessment:  His SLR has improved to 65 deg at this time, but still limited compared to L. Re-instated passive sciatic nerve glides which he responded well to and felt a stretch.   He cont to be a bit limited  with segmental flexion in sitting, but improved mobility noted with overpressure and towards the end of the set. Cont to strengthen ES and multifidi as he feels a bit weak here.    Goals:   (to be met in 13 visits)   Pt will improve transversus abdominis recruitment to perform proper isometric contraction without requiring verbal or tactile cuing to promote advancement of therex (MET)  Pt will demonstrate good understanding of proper posture and body mechanics to decrease pain and improve spinal safety   Pt will improve lumbar spine AROM ext to >10 deg to allow increase ease withstanding for 30 min at a time (in progress)  Pt will report improved symptom centralization and absence of radicular symptoms for 3 consecutive days to improve function with ADL (in progress)  Pt will improve SLR to 60 deg on the R to demo decreased neural tension. (Improving)   Pt will have decreased paraspinal mm tension to tolerate standing >40 minutes for work and home activities , MET upgraded to 1 hour   Pt will demonstrate improved core strength to be able to perform 10 squats with <2/10 pain (in progress)  Pt will be independent and compliant with comprehensive HEP to maintain progress achieved in PT. Progressing.        68 % improvement  Post Oswestry Disability Index Score  Post Score: 10 % (11/20/2023 11:57 AM)    68 % improvement    Plan: Re-test SLR and slump next time.   Date:11/13/2023   Tx#:9/15 Date:11/20/2023   Tx#:10/15 Date:12/6/2023   Tx#:11/15 Date:12/13/2023   Tx#:12/15 Date:12/27/2023   Tx#:13/15 Date:1/10/2024   Tx#:14/15 Date:1/24/2024   Tx#:15/18 Date: 1/31/2024   Tx#:16/18   Therex:  2x10 lat pull down, 70 lbs Therex:  10x segmental lumbar flex seated  10x thoracic ext over foam roll with OP Therex:  2x10 R SL calf raises  2x10 B calf raises on slantboard for quad contraction  3x30\" slantboard stretch Neuro re-ed:  15x sciatic nerve glide in slump position (every other adductor bias) Therex:  2x10 1/2 half tall  kneel chop, 40 lbs, B  2x15 rotational press, 30 lbs Therex;  15x standing chop, 40 lbs, B Therex;  3x15\" extreme flex/rotate stretch  3x15\" SKTC stretch followed by sciatic N glide after, B Neuro re-ed:  15x passive L sciatic nerve glide with ankle floss   10x squat to lift diagonal PNF, 8 lb ball, B  4x20\" lat TRX   2x10 SL shuttle press, 75 lbs PN see above   2x10 goblet squats with 15 lbs to box  2x8 posterior lunged to row, 30 lbs 2x10 plank onto bosu with hip flex and IR.  2x10 supermans over bosu NMEs on the bottom lateral surface of R foot (350 usec, 70 hz, 23 mA) for aggressive sensory stim throughout session, 20\"/8\" off NMEs on the bottom lateral surface of R foot (300 usec, 15 hz, 23 mA) for aggressive sensory stim throughout session, 15\"/5\" off 10x prone push up woth OP at L4 then T10  6x press up on elbows with OP at L4 Therex:  2x30\" prayer stretch, fwd and to both sides  12x seated slump and return, segmental flex   2x10 shelf position TA brace c shoulder full shld flex holding 4 lb med ball Manual Therapy(10')  Prone lumbar mutifidi STM, SL rot and flex for assessment  Prone C PA T10-TL2 G3 CT junction mob  2x10 lat pulldowns,   80 lbs  2x10 low rows, 35 lbs, B  2x8 multifidi ext off edge of mat 2x10 bridges with feet on bosu  2x8 [place] get up, 15 lbs, b (mod not to full standing) 2x10 plank onto bosu with hip flex and IR.  2x10 supermans over bosu 2x10 lumbar extension off edge of mat holding 10 lbs  2x10 quadruped bird/dogs, holding 5 lbs each hand 2x10 shuttle press, 81 lbs  15x SL press, 56 lbs, B Manual:  Flex rotate mobs near end range GR III  GR III PA mobs L3-L4 and T10  X8 min   15x hamstring bridge red SB  2x10 ham bridges, red SB   Manual Therapy(9')  Prone lumbar mutifidi STM, SL rot and flex for assessment  Prone C PA T10-TL2 G3 10x squat lifts 25 lbs in crate  10x lunge to bosu with rotation 6 lb med ball  3x20\" lat stretch on TRX  15x squats to row 45 lbs Manual Therapy(9')  Prone  lumbar mutifidi STM, SL rot and flex for assessment    Prone GR III CPA T12-L2 Manual therapy:  Prone GR III CPA T12-L2, effleurage to lumbar paraspinals  X10 min 2x10 goblet squats with 15 lbs to bosu to mid chest lift    Manual therapy:  Prone GR III CPA T12-L2, effleurage to lumbar paraspinals  X10 min 15x mod [place] get ups, 8 lbs   2x10 prone hip ext, 4 lbs  15x quadruped hip ext on shuttle 50 lbs 2x10 squat to row, 40 lbs    Sensory level stim on R foot with exercise.  2x10 prone over bosu supermans with 4 lb weights  2x10 posterior lunges, 7 lbs  2x10 prone extensions off edge of mat holding 10 lb weight.        Manual therapy:  Prone GR III CPA T12-L2, effleurage to lumbar paraspinals  X10 min Manual therapy:  Prone GR III CPA T10 and L3-L4, effleurage to lumbar paraspinals; long axis traction  X10 min -   HEP: : 2x10 pelvic tilts, 15x TA brace, 2x30\" hamstring stretch, 15x sciatic n glides, then adductor bias glides with floss x10 min, single leg heel raise    Charges: manual:1, therex:2 Total Timed Treatment: 41 min  Total Treatment Time: 41 min

## 2024-02-05 ENCOUNTER — OFFICE VISIT (OUTPATIENT)
Dept: PHYSICAL THERAPY | Facility: HOSPITAL | Age: 34
End: 2024-02-05
Attending: FAMILY MEDICINE
Payer: COMMERCIAL

## 2024-02-05 PROCEDURE — 97140 MANUAL THERAPY 1/> REGIONS: CPT

## 2024-02-05 PROCEDURE — 97110 THERAPEUTIC EXERCISES: CPT

## 2024-02-05 NOTE — PROGRESS NOTES
Diagnosis:     S/P lumbar microdiscectomy (Z98.890)    Referring Provider: Nena  Date of Evaluation:    10/11/2023    Precautions:   no lifting over 20 lbs s/p L5 microdisectomy on Sept 6, 2023.  Next MD visit:   none scheduled  Date of Surgery: Sept 6, 2023.    Insurance Primary/Secondary: [employer] [place] / N/A     # Auth Visits: 18 authorized     (5 additional from start of new year)       Subjective: Mat reports same general symptoms of dull low back pain.  Pain:  Back pain:  1/10    Foot numbness: 1/10       Objective:   P1: posterior thigh  P2: back (upper lumbar) (not hurting currently, just with flexion)  P3: numbness is better in R foot, but cotninues(some paresthesia with leg elevated which feels like relief then goes back to numb)  Current functional limitations include: bending, lifting, sitting, longer car rides, exercise right now (as post op). Sleep is ok.      AROM: (* denotes performed with pain)  Flexion:62 deg (improved from 55 deg) with stretch in back R thigh   Extension: 7 deg; min limited (hinges higher at T11)  Sidebending: B to 1\" past knee joint line   Rotation: R min to nil limited (tightness); L nil limited (tightness)    SLR:   R: 65 deg improved from 50 deg (increased from 44 )(relief with plantar flexion)   L : 75 deg improved from 65 deg    Single leg heel raise: R 14 x (improved from 9); L 20      Strength: (* denotes performed with pain)  LE   Hip flexion (L2): R 5/5; L 5/5  Hip abduction: R NT/5; L NT/5  Hip Extension: R NT/5; L NT/5            Hip ER: R 5/5; L 5/5  Hip IR: R NT/5; L NT/5  Knee Flexion: R 5/5; L 5/5            Knee extension (L3): R 5/5; L 5/5            DF (L4): R 5/5; L 5/5  Great Toe Ext (L5): R 5/5, L 5/5  PF (S1): R 4-/5; L 5/5         Assessment:  Mat felt relief in low back pain following a cavitation during PA mobs. Cont to address neural tension on the R leg, which he responds well to. Also, incorporating further posterior chain exercises. DC next time  with HEP.    Goals:   (to be met in 13 visits)   Pt will improve transversus abdominis recruitment to perform proper isometric contraction without requiring verbal or tactile cuing to promote advancement of therex (MET)  Pt will demonstrate good understanding of proper posture and body mechanics to decrease pain and improve spinal safety   Pt will improve lumbar spine AROM ext to >10 deg to allow increase ease withstanding for 30 min at a time (in progress)  Pt will report improved symptom centralization and absence of radicular symptoms for 3 consecutive days to improve function with ADL (in progress)  Pt will improve SLR to 60 deg on the R to demo decreased neural tension. (Improving)   Pt will have decreased paraspinal mm tension to tolerate standing >40 minutes for work and home activities , MET upgraded to 1 hour   Pt will demonstrate improved core strength to be able to perform 10 squats with <2/10 pain (in progress)  Pt will be independent and compliant with comprehensive HEP to maintain progress achieved in PT. Progressing.        68 % improvement  Post Oswestry Disability Index Score  Post Score: 10 % (11/20/2023 11:57 AM)    68 % improvement    Plan: Re-test SLR and slump next time.   Date:11/13/2023   Tx#:9/15 Date:11/20/2023   Tx#:10/15 Date:12/6/2023   Tx#:11/15 Date:12/13/2023   Tx#:12/15 Date:12/27/2023   Tx#:13/15 Date:1/10/2024   Tx#:14/15 Date:1/24/2024   Tx#:15/18 Date: 1/31/2024   Tx#:16/18 Date:2/5/2024   Tx#:17/18   Therex:  2x10 lat pull down, 70 lbs Therex:  10x segmental lumbar flex seated  10x thoracic ext over foam roll with OP Therex:  2x10 R SL calf raises  2x10 B calf raises on slantboard for quad contraction  3x30\" slantboard stretch Neuro re-ed:  15x sciatic nerve glide in slump position (every other adductor bias) Therex:  2x10 1/2 half tall kneel chop, 40 lbs, B  2x15 rotational press, 30 lbs Therex;  15x standing chop, 40 lbs, B Therex;  3x15\" extreme flex/rotate stretch  3x15\"  SKTC stretch followed by sciatic N glide after, B Neuro re-ed:  15x passive L sciatic nerve glide with ankle floss 3x10 lat pulldown, 90 lbs  15 x seated sciatic nerve glide slump  15x spine sciatic n glide passive    10x squat to lift diagonal PNF, 8 lb ball, B  4x20\" lat TRX   2x10 SL shuttle press, 75 lbs PN see above   2x10 goblet squats with 15 lbs to box  2x8 posterior lunged to row, 30 lbs 2x10 plank onto bosu with hip flex and IR.  2x10 supermans over bosu NMEs on the bottom lateral surface of R foot (350 usec, 70 hz, 23 mA) for aggressive sensory stim throughout session, 20\"/8\" off NMEs on the bottom lateral surface of R foot (300 usec, 15 hz, 23 mA) for aggressive sensory stim throughout session, 15\"/5\" off 10x prone push up woth OP at L4 then T10  6x press up on elbows with OP at L4 Therex:  2x30\" prayer stretch, fwd and to both sides  12x seated slump and return, segmental flex -   2x10 shelf position TA brace c shoulder full shld flex holding 4 lb med ball Manual Therapy(10')  Prone lumbar mutifidi STM, SL rot and flex for assessment  Prone C PA T10-TL2 G3 CT junction mob  2x10 lat pulldowns,   80 lbs  2x10 low rows, 35 lbs, B  2x8 multifidi ext off edge of mat 2x10 bridges with feet on bosu  2x8 [place] get up, 15 lbs, b (mod not to full standing) 2x10 plank onto bosu with hip flex and IR.  2x10 supermans over bosu 2x10 lumbar extension off edge of mat holding 10 lbs  2x10 quadruped bird/dogs, holding 5 lbs each hand 2x10 shuttle press, 81 lbs  15x SL press, 56 lbs, B Manual:  Flex rotate mobs near end range GR III  GR III PA mobs L3-L4 and T10  X8 min Manual:  Flex rotate mobs near end range GR III  GR III PA mobs L3-L4 and T10  X8 min   15x hamstring bridge red SB  2x10 ham bridges, red SB   Manual Therapy(9')  Prone lumbar mutifidi STM, SL rot and flex for assessment  Prone C PA T10-TL2 G3 10x squat lifts 25 lbs in crate  10x lunge to bosu with rotation 6 lb med ball  3x20\" lat stretch on TRX  15x  squats to row 45 lbs Manual Therapy(9')  Prone lumbar mutifidi STM, SL rot and flex for assessment    Prone GR III CPA T12-L2 Manual therapy:  Prone GR III CPA T12-L2, effleurage to lumbar paraspinals  X10 min 2x10 goblet squats with 15 lbs to bosu to mid chest lift    Manual therapy:  Prone GR III CPA T12-L2, effleurage to lumbar paraspinals  X10 min 15x mod [place] get ups, 8 lbs   2x10 prone hip ext, 4 lbs  15x quadruped hip ext on shuttle 50 lbs 2x10 squat to row, 40 lbs    Sensory level stim on R foot with exercise.  2x10 prone over bosu supermans with 4 lb weights  2x10 posterior lunges, 7 lbs  2x10 prone extensions off edge of mat holding 10 lb weight. Therex:  2x10 DL lower with shld flex, 6 lb med ball  2x10 modified [place] gettups, 10 lbs  15x cat/camels  2x10 alt shoulder flex and hip ext, 4lbs        Manual therapy:  Prone GR III CPA T12-L2, effleurage to lumbar paraspinals  X10 min Manual therapy:  Prone GR III CPA T10 and L3-L4, effleurage to lumbar paraspinals; long axis traction  X10 min - 2x10 squat to row, 45 lbs   HEP: : 2x10 pelvic tilts, 15x TA brace, 2x30\" hamstring stretch, 15x sciatic n glides, then adductor bias glides with floss x10 min, single leg heel raise    Charges: manual:1, therex:2 Total Timed Treatment: 41 min  Total Treatment Time: 41 min

## 2024-02-12 ENCOUNTER — OFFICE VISIT (OUTPATIENT)
Dept: PHYSICAL THERAPY | Facility: HOSPITAL | Age: 34
End: 2024-02-12
Attending: FAMILY MEDICINE
Payer: COMMERCIAL

## 2024-02-12 PROCEDURE — 97140 MANUAL THERAPY 1/> REGIONS: CPT

## 2024-02-12 PROCEDURE — 97110 THERAPEUTIC EXERCISES: CPT

## 2024-02-12 NOTE — PROGRESS NOTES
Diagnosis:     S/P lumbar microdiscectomy (Z98.890)    Referring Provider: Nena  Date of Evaluation:    10/11/2023    Precautions:   no lifting over 20 lbs s/p L5 microdisectomy on Sept 6, 2023.  Next MD visit:   none scheduled  Date of Surgery: Sept 6, 2023.    Insurance Primary/Secondary: [employer] [place] / N/A     # Auth Visits: 18 authorized     (5 additional from start of new year)    Discharge Summary  Pt has attended 18 visits in Physical Therapy.      Subjective: Mat continues to have lingering foot numbness, that is constant. He does not have any back pain currently, can have some soreness with flexion but will cont to exercise this. He mentions great improvements since starting therapy, he was very tight today. He feels improved strength overall.   Pain:  Back pain:  0/10    Foot numbness: 1/10       Objective:       AROM: (* denotes performed with pain)  Flexion:80 deg improved from 62 deg (improved from 55 deg) with stretch in back R thigh   Extension: 20 deg improved from 7 deg; min limited (better overall improved lumbar ext)  Sidebending: B to 1\" past knee joint line, more limited L   Rotation: R min to nil limited (tightness); L nil limited (tightness)    SLR:   R:  improved from 50 deg (increased from 44 )(relief with plantar flexion)   L : 75 deg improved from 65 deg    Single leg heel raise: R  16x improved from 14 x (improved from 9); L 20      Strength: (* denotes performed with pain)  LE   Hip flexion (L2): R 5/5; L 5/5  Hip abduction: R NT/5; L NT/5  Hip Extension: R NT/5; L NT/5            Hip ER: R 5/5; L 5/5  Hip IR: R NT/5; L NT/5  Knee Flexion: R 5/5; L 5/5            Knee extension (L3): R 5/5; L 5/5            DF (L4): R 5/5; L 5/5  Great Toe Ext (L5): R 5/5, L 5/5  PF (S1): R 4/5; L 5/5         Assessment:         Pt demonstrates normal LE at this time, but is still limited with PF. His AROM lumbar spine is normal and so is his SLR. He continues to be limited by numbness in his R  foot. Advised him to cont with is nerve glides for neural tension and to perform HEP. He has met the majority of his LTG and is to DC at this time.  He is free to DC with HEP at this time, follow up with PT and or MD in future as needed.     Goals:   (to be met in 13 visits)   Pt will improve transversus abdominis recruitment to perform proper isometric contraction without requiring verbal or tactile cuing to promote advancement of therex (MET). Goal met.  Pt will demonstrate good understanding of proper posture and body mechanics to decrease pain and improve spinal safety Goal met.  Pt will improve lumbar spine AROM ext to >10 deg to allow increase ease withstanding for 30 min at a time (in progress) Goal met.  Pt will report improved symptom centralization and absence of radicular symptoms for 3 consecutive days to improve function with ADL (in progress) Goal met.  Pt will improve SLR to 60 deg on the R to demo decreased neural tension. (Improving)   Pt will have decreased paraspinal mm tension to tolerate standing >40 minutes for work and home activities , MET upgraded to 1 hour   Pt will demonstrate improved core strength to be able to perform 10 squats with <2/10 pain (in progress)Goal met.  Pt will be independent and compliant with comprehensive HEP to maintain progress achieved in PT. Goal met.        68 % improvement  Post Oswestry Disability Index Score  Post Score: 10 % (11/20/2023 11:57 AM)    68 % improvement    Post Oswestry Disability Index Score  Post Score: 8 % (2/12/2024 11:11 AM)    70 % improvement    Plan: DC with HEP (See below), intermittently incorporated into routine.      Hep:  Access Code: [email or web address]  URL: [email or web address]  Date: 02/12/2024  Prepared by: Ariadna Barksdale    Exercises  - Supine Sciatic Nerve Glide  - 1 x daily - 7 x weekly - 3 sets - 10 reps  - Full Superman on Table  - 1 x daily - 7 x weekly - 3 sets - 10 reps  - Superman with BOSU® Ball   - 1 x daily - 7 x weekly  - 3 sets - 10 reps  - Bird Dog  - 1 x daily - 7 x weekly - 3 sets - 10 reps  - Supine Lower Trunk Rotation  - 1 x daily - 7 x weekly - 3 sets - 10 reps  - Cat Cow  - 1 x daily - 7 x weekly - 3 sets - 10 reps  - Standing Lumbar Extension  - 1 x daily - 7 x weekly - 3 sets - 10 reps  - Squat and Row with TRX®  - 1 x daily - 7 x weekly - 3 sets - 10 reps  - Lateral Lunge  - 1 x daily - 7 x weekly - 3 sets - 10 reps  - Reverse Lunge  - 1 x daily - 7 x weekly - 3 sets - 10 reps  - Single Leg Bridge  - 1 x daily - 7 x weekly - 3 sets - 10 reps  - Marching Bridge  - 1 x daily - 7 x weekly - 3 sets - 10 reps    Patient/Family/Caregiver was advised of these findings, precautions, and treatment options and has agreed to actively participate in planning and for this course of care.    Thank you for your referral. If you have any questions, please contact me at Dept: [phone].    Sincerely,  Electronically signed by therapist: Ariadna Barksdale, PT , DPT    Physician's certification required:  No  Please co-sign or sign and return this letter via fax as soon as possible to [phone].   I certify the need for these services furnished under this plan of treatment and while under my care.    X___________________________________________________ Date____________________    Certification From: 2/12/2024  To:5/12/2024    Date:11/13/2023   Tx#:9/15 Date:11/20/2023   Tx#:10/15 Date:12/6/2023   Tx#:11/15 Date:12/13/2023   Tx#:12/15 Date:12/27/2023   Tx#:13/15 Date:1/10/2024   Tx#:14/15 Date:1/24/2024   Tx#:15/18 Date: 1/31/2024   Tx#:16/18 Date:2/5/2024   Tx#:17/18 Date:2/12/2024   Tx#:18/18   Therex:  2x10 lat pull down, 70 lbs Therex:  10x segmental lumbar flex seated  10x thoracic ext over foam roll with OP Therex:  2x10 R SL calf raises  2x10 B calf raises on slantboard for quad contraction  3x30\" slantboard stretch Neuro re-ed:  15x sciatic nerve glide in slump position (every other adductor bias) Therex:  2x10 1/2 half tall  kneel chop, 40 lbs, B  2x15 rotational press, 30 lbs Therex;  15x standing chop, 40 lbs, B Therex;  3x15\" extreme flex/rotate stretch  3x15\" SKTC stretch followed by sciatic N glide after, B Neuro re-ed:  15x passive L sciatic nerve glide with ankle floss 3x10 lat pulldown, 90 lbs  15 x seated sciatic nerve glide slump  15x spine sciatic n glide passive  DC assessment see above x15 min  HEP overview x5 min   10x squat to lift diagonal PNF, 8 lb ball, B  4x20\" lat TRX   2x10 SL shuttle press, 75 lbs PN see above   2x10 goblet squats with 15 lbs to box  2x8 posterior lunged to row, 30 lbs 2x10 plank onto bosu with hip flex and IR.  2x10 supermans over bosu NMEs on the bottom lateral surface of R foot (350 usec, 70 hz, 23 mA) for aggressive sensory stim throughout session, 20\"/8\" off NMEs on the bottom lateral surface of R foot (300 usec, 15 hz, 23 mA) for aggressive sensory stim throughout session, 15\"/5\" off 10x prone push up woth OP at L4 then T10  6x press up on elbows with OP at L4 Therex:  2x30\" prayer stretch, fwd and to both sides  12x seated slump and return, segmental flex - Therex:  12x prone supermans  15x [place] getups.    2x10 shelf position TA brace c shoulder full shld flex holding 4 lb med ball Manual Therapy(10')  Prone lumbar mutifidi STM, SL rot and flex for assessment  Prone C PA T10-TL2 G3 CT junction mob  2x10 lat pulldowns,   80 lbs  2x10 low rows, 35 lbs, B  2x8 multifidi ext off edge of mat 2x10 bridges with feet on bosu  2x8 [place] get up, 15 lbs, b (mod not to full standing) 2x10 plank onto bosu with hip flex and IR.  2x10 supermans over bosu 2x10 lumbar extension off edge of mat holding 10 lbs  2x10 quadruped bird/dogs, holding 5 lbs each hand 2x10 shuttle press, 81 lbs  15x SL press, 56 lbs, B Manual:  Flex rotate mobs near end range GR III  GR III PA mobs L3-L4 and T10  X8 min Manual:  Flex rotate mobs near end range GR III  GR III PA mobs L3-L4 and T10  X8 min Manual:   GR III PA mobs  L3-L4 and T10   15x hamstring bridge red SB  2x10 ham bridges, red SB   Manual Therapy(9')  Prone lumbar mutifidi STM, SL rot and flex for assessment  Prone C PA T10-TL2 G3 10x squat lifts 25 lbs in crate  10x lunge to bosu with rotation 6 lb med ball  3x20\" lat stretch on TRX  15x squats to row 45 lbs Manual Therapy(9')  Prone lumbar mutifidi STM, SL rot and flex for assessment    Prone GR III CPA T12-L2 Manual therapy:  Prone GR III CPA T12-L2, effleurage to lumbar paraspinals  X10 min 2x10 goblet squats with 15 lbs to bosu to mid chest lift    Manual therapy:  Prone GR III CPA T12-L2, effleurage to lumbar paraspinals  X10 min 15x mod Chinese get ups, 8 lbs   2x10 prone hip ext, 4 lbs  15x quadruped hip ext on shuttle 50 lbs 2x10 squat to row, 40 lbs    Sensory level stim on R foot with exercise.  2x10 prone over bosu supermans with 4 lb weights  2x10 posterior lunges, 7 lbs  2x10 prone extensions off edge of mat holding 10 lb weight. Therex:  2x10 DL lower with shld flex, 6 lb med ball  2x10 modified Chinese gettups, 10 lbs  15x cat/camels  2x10 alt shoulder flex and hip ext, 4lbs 10x flex rotate self mob 5\" hold        Manual therapy:  Prone GR III CPA T12-L2, effleurage to lumbar paraspinals  X10 min Manual therapy:  Prone GR III CPA T10 and L3-L4, effleurage to lumbar paraspinals; long axis traction  X10 min - 2x10 squat to row, 45 lbs -   HEP: : 2x10 pelvic tilts, 15x TA brace, 2x30\" hamstring stretch, 15x sciatic n glides, then adductor bias glides with floss x10 min, single leg heel raise    Charges: manual:1, therex:2 Total Timed Treatment: 41 min  Total Treatment Time: 41 min

## 2025-04-08 ENCOUNTER — HOSPITAL ENCOUNTER (OUTPATIENT)
Age: 35
Discharge: HOME | End: 2025-04-08
Payer: COMMERCIAL

## 2025-04-08 VITALS
DIASTOLIC BLOOD PRESSURE: 84 MMHG | OXYGEN SATURATION: 98 % | HEART RATE: 59 BPM | TEMPERATURE: 98 F | SYSTOLIC BLOOD PRESSURE: 141 MMHG | RESPIRATION RATE: 17 BRPM

## 2025-04-08 DIAGNOSIS — J02.9 PHARYNGITIS, UNSPECIFIED ETIOLOGY: Primary | ICD-10-CM

## 2025-04-08 LAB — S PYO AG THROAT QL: NEGATIVE

## 2025-04-08 PROCEDURE — 87880 STREP A ASSAY W/OPTIC: CPT | Performed by: PHYSICIAN ASSISTANT

## 2025-04-08 PROCEDURE — 99213 OFFICE O/P EST LOW 20 MIN: CPT | Performed by: PHYSICIAN ASSISTANT

## 2025-04-08 RX ORDER — DEXAMETHASONE SODIUM PHOSPHATE 10 MG/ML
10 INJECTION, SOLUTION INTRAMUSCULAR; INTRAVENOUS ONCE
Status: COMPLETED | OUTPATIENT
Start: 2025-04-08 | End: 2025-04-08

## 2025-04-08 NOTE — ED INITIAL ASSESSMENT (HPI)
Pt c/o sensation of something in his throat. Pt states the left side of his neck is painful to touch. Pt denies swelling to the area. Painful to swallow no drooling.

## 2025-04-08 NOTE — ED PROVIDER NOTES
Patient Seen in: [facility]      History     Chief Complaint   Patient presents with    Sore Throat     Pain in the front left of the neck. Seems to slightly radiate to ear - Entered by patient    Throat Problem     Stated Complaint: Sore Throat - Pain in the front left of the neck. Seems to slightly radiate to *    Subjective:   HPI  Damon Scott is a 34 year old male  presents with left sided intermittent throat pain x 3 days. Patient reports intermittent dysphagia to both solids and liquids, with radiation to left ear pain,  worsened with leftward neck rotation. Patient denies rhinorrhea, fevers, chills,shortness of breath, respiratory distress, stridor, neck pain/ stiffness, headache, eye pain/ redness, facial/ lip/ eyelid swelling. No medications taken prior to arrival. No alleviating/ aggravating factors. Pain 1-3/10          Objective:     Past Medical History:    COVID-19    body aches, loss of taste and smell. No hospitalization              Past Surgical History:   Procedure Laterality Date    Back surgery  09/06/2023    L5-S1 microdsicectomy    Other surgical history                  Social History     Socioeconomic History    Marital status:    Tobacco Use    Smoking status: Never    Smokeless tobacco: Never   Vaping Use    Vaping status: Never Used   Substance and Sexual Activity    Alcohol use: Not Currently     Comment: ocassionally    Drug use: Yes     Comment: CBD oil              Review of Systems   All other systems reviewed and are negative.      Positive for stated complaint: Sore Throat - Pain in the front left of the neck. Seems to slightly radiate to *  Other systems are as noted in HPI.  Constitutional and vital signs reviewed.      All other systems reviewed and negative except as noted above.    Physical Exam     ED Triage Vitals [04/08/25 1233]   /84   Pulse 59   Resp 17   Temp 97.7 °F (36.5 °C)   Temp src Oral   SpO2 98 %   O2 Device None (Room air)        Current Vitals:   Vital Signs  BP: 141/84  Pulse: 59  Resp: 17  Temp: 97.7 °F (36.5 °C)  Temp src: Oral    Oxygen Therapy  SpO2: 98 %  O2 Device: None (Room air)        Physical Exam  Vitals and nursing note reviewed.   Constitutional:       General: He is not in acute distress.     Appearance: Normal appearance. He is normal weight. He is not ill-appearing, toxic-appearing or diaphoretic.   HENT:      Head: Normocephalic and atraumatic.      Right Ear: Tympanic membrane and ear canal normal.      Left Ear: Tympanic membrane and ear canal normal.      Nose: No congestion or rhinorrhea.      Mouth/Throat:      Mouth: Mucous membranes are moist.      Pharynx: Oropharynx is clear. No oropharyngeal exudate or posterior oropharyngeal erythema.   Eyes:      Extraocular Movements: Extraocular movements intact.      Conjunctiva/sclera: Conjunctivae normal.      Pupils: Pupils are equal, round, and reactive to light.   Neck:      Vascular: No carotid bruit.   Cardiovascular:      Rate and Rhythm: Normal rate.      Pulses: Normal pulses.   Pulmonary:      Effort: Pulmonary effort is normal. No respiratory distress.      Breath sounds: Normal breath sounds. No stridor. No wheezing, rhonchi or rales.   Chest:      Chest wall: No tenderness.   Musculoskeletal:         General: No swelling, tenderness, deformity or signs of injury. Normal range of motion.      Cervical back: Normal range of motion and neck supple. No rigidity or tenderness.      Right lower leg: No edema.      Left lower leg: No edema.   Lymphadenopathy:      Cervical: No cervical adenopathy.   Skin:     General: Skin is warm and dry.      Capillary Refill: Capillary refill takes less than 2 seconds.      Coloration: Skin is not jaundiced or pale.      Findings: No bruising, erythema, lesion or rash.   Neurological:      General: No focal deficit present.      Mental Status: He is alert and oriented to person, place, and time. Mental status is at baseline.    Psychiatric:         Mood and Affect: Mood normal.         Behavior: Behavior normal.         Thought Content: Thought content normal.         Judgment: Judgment normal.             ED Course     Labs Reviewed   POCT RAPID STREP - Normal     Results for orders placed or performed during the hospital encounter of 04/08/25   POCT Rapid Strep    Collection Time: 04/08/25 12:53 PM   Result Value Ref Range    POCT Rapid Strep Negative Negative     Medications   dexamethasone PF (Decadron) 10 mg/mL vial as ORAL solution 10 mg (10 mg Oral Given 4/8/25 1306)     Vitals:    04/08/25 1233   BP: 141/84   Pulse: 59   Resp: 17   Temp: 97.7 °F (36.5 °C)                   MDM             Medical Decision Making  34 year old well appearing male  presents with left sided intermittent throat pain x 3 days.  Considerations to include but not limited to peritonsillar abscess versus viral pharyngitis versus mononucleosis versus retropharyngeal abscess  Plan  - SpO2 98% on room air    - labs: strep swab  - meds: decadron 10mg po now  - reassess    - rx:     - OTC:  ibuprofen 600mg po q 8 hours/ prn.  Tylenol 1 g po q 6 hours/ prn. cepacol lozenges po every 4-6 hours/ prn.   - encourage oral fluid rehydration and warm salt water rinses for symptomatic relief.   - refer to ENT/ primary care physician  - Return to ED if symptoms worsen    Amount and/or Complexity of Data Reviewed  Labs: ordered. Decision-making details documented in ED Course.     Details: strep swab- negative           Disposition and Plan     Clinical Impression:  1. Pharyngitis, unspecified etiology         Disposition:  Discharge  4/8/2025  1:04 pm    Follow-up:  Arthur Banks MD  [address] [place] [address]  [facility] [id number] [phone]          [facility]  [address] [facility] [address]  [phone]        Mikey Mitchell MD  [address] [facility] [id number] [phone]                Medications  Prescribed:  Discharge Medication List as of 4/8/2025  1:06 PM              Supplementary Documentation:

## 2025-05-13 ENCOUNTER — OFFICE VISIT (OUTPATIENT)
Dept: FAMILY MEDICINE CLINIC | Facility: CLINIC | Age: 35
End: 2025-05-13
Payer: COMMERCIAL

## 2025-05-13 VITALS
SYSTOLIC BLOOD PRESSURE: 122 MMHG | HEIGHT: 71 IN | WEIGHT: 206 LBS | HEART RATE: 72 BPM | RESPIRATION RATE: 16 BRPM | DIASTOLIC BLOOD PRESSURE: 72 MMHG | BODY MASS INDEX: 28.84 KG/M2 | OXYGEN SATURATION: 98 %

## 2025-05-13 DIAGNOSIS — Z13.0 SCREENING FOR DEFICIENCY ANEMIA: ICD-10-CM

## 2025-05-13 DIAGNOSIS — Z13.220 LIPID SCREENING: ICD-10-CM

## 2025-05-13 DIAGNOSIS — Z00.00 WELLNESS EXAMINATION: Primary | ICD-10-CM

## 2025-05-13 DIAGNOSIS — Z13.29 SCREENING FOR THYROID DISORDER: ICD-10-CM

## 2025-05-13 PROCEDURE — 99395 PREV VISIT EST AGE 18-39: CPT | Performed by: FAMILY MEDICINE

## 2025-05-13 PROCEDURE — 3074F SYST BP LT 130 MM HG: CPT | Performed by: FAMILY MEDICINE

## 2025-05-13 PROCEDURE — 3008F BODY MASS INDEX DOCD: CPT | Performed by: FAMILY MEDICINE

## 2025-05-13 PROCEDURE — 3078F DIAST BP <80 MM HG: CPT | Performed by: FAMILY MEDICINE

## 2025-05-13 NOTE — PROGRESS NOTES
HPI:   Damon Scott is a 34 year old male who presents for an Annual Health Visit.     Hre for wellness visit. Feels like gaining weight over the past vicky.    Had back surgery 1.5 years ago, had numbness in his foot and that seems to have remained. But pain improved.    Has noted that BP somtims comesup high. Follows a home monitor.    Social:  , 2 kids at home  Working from home.  Doing some renovation work at the house.    Allergies:   No Known Allergies    CURRENT MEDICATIONS   No current outpatient medications on file.      HISTORICAL INFORMATION   Past Medical History:    COVID-19    body aches, loss of taste and smell. No hospitalization      Past Surgical History:   Procedure Laterality Date    Back surgery  09/06/2023    L5-S1 microdsicectomy    Other surgical history        Family History   Problem Relation Age of Onset    Heart Disease Paternal Grandfather       SOCIAL HISTORY   Social History     Socioeconomic History    Marital status:    Tobacco Use    Smoking status: Never    Smokeless tobacco: Never   Vaping Use    Vaping status: Never Used   Substance and Sexual Activity    Alcohol use: Not Currently     Comment: ocassionally    Drug use: Yes     Comment: CBD oil     Social Drivers of Health     Food Insecurity: No Food Insecurity (5/13/2025)    NCSS - Food Insecurity     Worried About Running Out of Food in the Last Year: No     Ran Out of Food in the Last Year: No   Transportation Needs: No Transportation Needs (5/13/2025)    NCSS - Transportation     Lack of Transportation: No   Housing Stability: Not At Risk (5/13/2025)    NCSS - Housing/Utilities     Has Housing: Yes     Worried About Losing Housing: No     Unable to Get Utilities: No     Social History     Social History Narrative    Not on file        REVIEW OF SYSTEMS:     Constitutional: negative  Eyes: negative  ENT: negative  Respiratory: negative  Cardiovascular: negative  Gastrointestinal: negative  Integument/Breast:   has some bumps on skin, had been thinking of seeing a derm, has red  Genitourinary: negative  Heme/Lymph: negative  Musculoskeletal: negative  Neurological:  see HPI  Psych: negative  Endocrine: negative  Allergic/Immune: negative    EXAM:   /72   Pulse 72   Resp 16   Ht 5' 11\" (1.803 m)   Wt 206 lb (93.4 kg)   SpO2 98%   BMI 28.73 kg/m²    Wt Readings from Last 6 Encounters:   05/13/25 206 lb (93.4 kg)   10/19/23 190 lb (86.2 kg)   09/22/23 190 lb (86.2 kg)   08/11/23 190 lb (86.2 kg)   08/23/23 189 lb (85.7 kg)   08/10/23 190 lb (86.2 kg)     Body mass index is 28.73 kg/m².    General: alert, appears stated age, and cooperative  Head: Normocephalic, without obvious abnormality, atraumatic  Eyes: conjunctivae/corneas clear. PERRL, EOM's intact. Fundi benign.  Ears: normal TM's and external ear canals both ears  Nose: Nares normal. Septum midline. Mucosa normal. No drainage or sinus tenderness.  Throat: lips, mucosa, and tongue normal; teeth and gums normal  Neck: no adenopathy, no carotid bruit, no JVD, supple, symmetrical, trachea midline, and thyroid not enlarged, symmetric, no tenderness/mass/nodules  Heart: S1, S2 normal, no murmur, click, rub or gallop, regular rate and rhythm  Lungs: clear to auscultation bilaterally  Chest wall: no tenderness  Abdomen: soft, non-tender; bowel sounds normal; no masses,  no organomegaly  : deferred  Back: symmetric, no curvature. ROM normal. No CVA tenderness.  Extremities: extremities normal, atraumatic, no cyanosis or edema  Pulses: 2+ and symmetric  Skin: Skin color, texture, turgor normal. No rashes or lesions  Lymph Nodes: Cervical, supraclavicular, and axillary nodes normal.  Neurologic: Grossly normal    ASSESSMENT AND PLAN:   Damon was seen today for physical.    Diagnoses and all orders for this visit:    Wellness examination  -     Comp Metabolic Panel (14); Future  -     CBC With Differential With Platelet; Future  -     Lipid Panel; Future    Lipid  screening  -     Lipid Panel; Future    Screening for deficiency anemia  -     CBC With Differential With Platelet; Future    Overall doing well, will get screening labs.  I recommend a diet low in processed foods, added sugars, and high in plant based foods, something like the mediterranean diet.    There are no Patient Instructions on file for this visit.    The patient indicates understanding of these issues and agrees to the plan.    Problem List:  Patient Active Problem List   Diagnosis    S/P lumbar microdiscectomy, right L5-S1       Arthur Banks MD  5/13/2025  1:03 PM

## 2025-05-15 ENCOUNTER — LABORATORY ENCOUNTER (OUTPATIENT)
Dept: LAB | Age: 35
End: 2025-05-15
Attending: FAMILY MEDICINE
Payer: COMMERCIAL

## 2025-05-15 DIAGNOSIS — Z00.00 WELLNESS EXAMINATION: ICD-10-CM

## 2025-05-15 DIAGNOSIS — Z13.29 SCREENING FOR THYROID DISORDER: ICD-10-CM

## 2025-05-15 DIAGNOSIS — Z13.0 SCREENING FOR DEFICIENCY ANEMIA: ICD-10-CM

## 2025-05-15 DIAGNOSIS — Z13.220 LIPID SCREENING: ICD-10-CM

## 2025-05-15 LAB
ALBUMIN SERPL-MCNC: 5 G/DL (ref 3.2–4.8)
ALBUMIN/GLOB SERPL: 1.9 {RATIO} (ref 1–2)
ALP LIVER SERPL-CCNC: 64 U/L (ref 45–117)
ALT SERPL-CCNC: 31 U/L (ref 10–49)
ANION GAP SERPL CALC-SCNC: 9 MMOL/L (ref 0–18)
AST SERPL-CCNC: 29 U/L (ref ?–34)
BASOPHILS # BLD AUTO: 0.03 X10(3) UL (ref 0–0.2)
BASOPHILS NFR BLD AUTO: 0.5 %
BILIRUB SERPL-MCNC: 0.8 MG/DL (ref 0.3–1.2)
BUN BLD-MCNC: 15 MG/DL (ref 9–23)
CALCIUM BLD-MCNC: 9.1 MG/DL (ref 8.7–10.6)
CHLORIDE SERPL-SCNC: 105 MMOL/L (ref 98–112)
CHOLEST SERPL-MCNC: 165 MG/DL (ref ?–200)
CO2 SERPL-SCNC: 25 MMOL/L (ref 21–32)
CREAT BLD-MCNC: 1.13 MG/DL (ref 0.7–1.3)
EGFRCR SERPLBLD CKD-EPI 2021: 87 ML/MIN/1.73M2 (ref 60–?)
EOSINOPHIL # BLD AUTO: 0.15 X10(3) UL (ref 0–0.7)
EOSINOPHIL NFR BLD AUTO: 2.7 %
ERYTHROCYTE [DISTWIDTH] IN BLOOD BY AUTOMATED COUNT: 12.6 %
FASTING PATIENT LIPID ANSWER: YES
FASTING STATUS PATIENT QL REPORTED: YES
GLOBULIN PLAS-MCNC: 2.7 G/DL (ref 2–3.5)
GLUCOSE BLD-MCNC: 93 MG/DL (ref 70–99)
HCT VFR BLD AUTO: 44.2 % (ref 39–53)
HDLC SERPL-MCNC: 48 MG/DL (ref 40–59)
HGB BLD-MCNC: 14.6 G/DL (ref 13–17.5)
IMM GRANULOCYTES # BLD AUTO: 0.01 X10(3) UL (ref 0–1)
IMM GRANULOCYTES NFR BLD: 0.2 %
LDLC SERPL CALC-MCNC: 106 MG/DL (ref ?–100)
LYMPHOCYTES # BLD AUTO: 2.49 X10(3) UL (ref 1–4)
LYMPHOCYTES NFR BLD AUTO: 44 %
MCH RBC QN AUTO: 29.1 PG (ref 26–34)
MCHC RBC AUTO-ENTMCNC: 33 G/DL (ref 31–37)
MCV RBC AUTO: 88.2 FL (ref 80–100)
MONOCYTES # BLD AUTO: 0.83 X10(3) UL (ref 0.1–1)
MONOCYTES NFR BLD AUTO: 14.7 %
NEUTROPHILS # BLD AUTO: 2.15 X10 (3) UL (ref 1.5–7.7)
NEUTROPHILS # BLD AUTO: 2.15 X10(3) UL (ref 1.5–7.7)
NEUTROPHILS NFR BLD AUTO: 37.9 %
NONHDLC SERPL-MCNC: 117 MG/DL (ref ?–130)
OSMOLALITY SERPL CALC.SUM OF ELEC: 289 MOSM/KG (ref 275–295)
PLATELET # BLD AUTO: 260 10(3)UL (ref 150–450)
POTASSIUM SERPL-SCNC: 3.9 MMOL/L (ref 3.5–5.1)
PROT SERPL-MCNC: 7.7 G/DL (ref 5.7–8.2)
RBC # BLD AUTO: 5.01 X10(6)UL (ref 4.3–5.7)
SODIUM SERPL-SCNC: 139 MMOL/L (ref 136–145)
TRIGL SERPL-MCNC: 52 MG/DL (ref 30–149)
TSI SER-ACNC: 1.09 UIU/ML (ref 0.55–4.78)
VLDLC SERPL CALC-MCNC: 9 MG/DL (ref 0–30)
WBC # BLD AUTO: 5.7 X10(3) UL (ref 4–11)

## 2025-05-15 PROCEDURE — 80053 COMPREHEN METABOLIC PANEL: CPT

## 2025-05-15 PROCEDURE — 80061 LIPID PANEL: CPT

## 2025-05-15 PROCEDURE — 85025 COMPLETE CBC W/AUTO DIFF WBC: CPT

## 2025-05-15 PROCEDURE — 36415 COLL VENOUS BLD VENIPUNCTURE: CPT

## 2025-05-15 PROCEDURE — 84443 ASSAY THYROID STIM HORMONE: CPT

## (undated) DEVICE — LAMINECTOMY CDS: Brand: MEDLINE INDUSTRIES, INC.

## (undated) DEVICE — Device

## (undated) DEVICE — DIFFUSER: Brand: CORE, MAESTRO

## (undated) DEVICE — DRAPE,LAPAROTOMY,PCH,STERILE: Brand: MEDLINE

## (undated) DEVICE — SUT MONOCRYL 3-0 PS-2 Y427H

## (undated) DEVICE — DERMABOND CLOSURE 0.7ML TOPICL

## (undated) DEVICE — SLEEVE KENDALL SCD EXPRESS MED

## (undated) DEVICE — SUT VICRYL 2-0 CP-2 J762D

## (undated) DEVICE — FRAZIER SUCTION INSTRUMENT 12 FR W/CONTROL VENT & OBTURATOR: Brand: FRAZIER

## (undated) DEVICE — SUT VICRYL 0 UR-6 J603H

## (undated) DEVICE — WRAP THERAPEUTIC BACK WO GEL P

## (undated) DEVICE — OIL CARTRIDGE: Brand: CORE, MAESTRO

## (undated) DEVICE — LIGHT HANDLE

## (undated) DEVICE — STERILE POLYISOPRENE POWDER-FREE SURGICAL GLOVES WITH EMOLLIENT COATING: Brand: PROTEXIS

## (undated) DEVICE — 3.0MM PRECISION NEURO (MATCH HEAD)

## (undated) DEVICE — DRAPE SURG 18X24

## (undated) DEVICE — FLOSEAL WITH RECOTHROM - 5ML: Brand: FLOSEAL HEMOSTATIC MATRIX

## (undated) DEVICE — STERILE SYNTHETIC POLYISOPRENE POWDER-FREE SURGICAL GLOVES WITH HYDROGEL COATING, SMOOTH FINISH, STRAIGHT FINGER: Brand: PROTEXIS

## (undated) DEVICE — MEGADYNE E-Z CLEAN BLADE 2.75"

## (undated) DEVICE — ALCOHOL 70% 4 OZ

## (undated) DEVICE — CURAD NONADHERANT PAD 3X8

## (undated) DEVICE — C-ARM: Brand: UNBRANDED

## (undated) DEVICE — Device: Brand: INTELLICART™

## (undated) DEVICE — SOL NACL IRRIG 0.9% 1000ML BTL

## (undated) DEVICE — STERILE POLYISOPRENE POWDER-FREE SURGICAL GLOVES: Brand: PROTEXIS

## (undated) DEVICE — GAUZE TRAY STERILE 4X4 12PLY

## (undated) DEVICE — APPLICATOR CHLORAPREP 26ML

## (undated) NOTE — LETTER
Patient Name: Bibi Romero  YOB: 1990          MRN :  [id number]  Date:  11/20/2023  Referring Physician:  Maricarmen Colindres    Diagnosis:     S/P lumbar microdiscectomy (A49.477)    Referring Provider: Charisse Tinoco  Date of Evaluation:    10/11/2023    Precautions:   no lifting over 20 lbs s/p L5 microdisectomy on Sept 6, 2023. Next MD visit:   none scheduled  Date of Surgery: Sept 6, 2023. Insurance Primary/Secondary: [employer] [facility] HMO / N/A     # Auth Visits: 10 authorized        Progress Summary  Pt has attended 10 visits in Physical Therapy. Subjective: Mat reports improvements in pain levels in everyday life. He is able to lift his kids with improved ease, the pain does not come as easily as it did before. Pain:  Back pain:  none at present, when sitting down can feel intermittently 2/10 and into posterior thigh     Foot numbness: 1/10       Objective:   P1: posterior thigh  P2: back (upper lumbar) (not hurting currently, just with flexion)  P3: numbness is better in R foot, but cotninues(some paresthesia with leg elevated which feels like relief then goes back to numb)  Current functional limitations include: bending, lifting, sitting, longer car rides, exercise right now (as post op). Sleep is ok.       AROM: (* denotes performed with pain)  Flexion:62 deg (improved from 55 deg) with stretch in back R thigh   Extension: 7 deg; min limited (hinges higher at T11)  Sidebending: B to 1\" past knee joint line   Rotation: R min to nil limited (tightness); L nil limited (tightness)    SLR:   R: 50 deg (increased from 44 )(relief with plantar flexion)   L : 65 deg improved from 53 deg    Single leg heel raise: R 14 x (improved from 9); L 20      Strength: (* denotes performed with pain)  LE   Hip flexion (L2): R 5/5; L 5/5  Hip abduction: R NT/5; L NT/5  Hip Extension: R NT/5; L NT/5            Hip ER: R 5/5; L 5/5  Hip IR: R NT/5; L NT/5  Knee Flexion: R 5/5; L 5/5            Knee extension (L3): R 5/5; L 5/5            DF (L4): R 5/5; L 5/5  Great Toe Ext (L5): R 5/5, L 5/5  PF (S1): R 4-/5; L 5/5         Assessment: Mat continues to demonstrate improved AROM of his lumbar spine in all planes. His R ankle DF has improved to 5/5 but continues to lack some PF strength. The weakness and numbness limitations are likely still from nerve compression. He continues to progress well towards LTG and demonstrates improved postural awareness and control. His Ulises score continues to improve as well. Increased ability for segmental flexion of lumbar spine noted, but painful at end range. He would benefit from further therapy to meet LTG. Goals:   (to be met in 13 visits)   Pt will improve transversus abdominis recruitment to perform proper isometric contraction without requiring verbal or tactile cuing to promote advancement of therex (MET)  Pt will demonstrate good understanding of proper posture and body mechanics to decrease pain and improve spinal safety   Pt will improve lumbar spine AROM ext to >10 deg to allow increase ease withstanding for 30 min at a time (in progress)  Pt will report improved symptom centralization and absence of radicular symptoms for 3 consecutive days to improve function with ADL (in progress)  Pt will improve SLR to 60 deg on the R to demo decreased neural tension. (Improving)   Pt will have decreased paraspinal mm tension to tolerate standing >40 minutes for work and home activities , MET upgraded to 1 hour   Pt will demonstrate improved core strength to be able to perform 10 squats with <2/10 pain (in progress)  Pt will be independent and compliant with comprehensive HEP to maintain progress achieved in PT. Progressing.     Oswestry Disability Index Score  Score: 78 % (7/19/2023 10:49 AM)    Post Oswestry Disability Index Score  Post Score: 14 % (10/27/2023  9:21 AM)    64 % improvement  Post Oswestry Disability Index Score  Post Score: 10 % (11/20/2023 11:57 AM)    68 % improvement    Plan: Continue skilled Physical Therapy 1-2 x/week or a total of 6-8 visits over a 30 day period. Treatment will include: manual, strength training, therex, neuro re-ed. Patient/Family/Caregiver was advised of these findings, precautions, and treatment options and has agreed to actively participate in planning and for this course of care. Thank you for your referral. If you have any questions, please contact me at Dept: [phone]. Sincerely,  Electronically signed by therapist: Mendoza Sanchez, PT , DPT    Physician's certification required:  Yes  Please co-sign or sign and return this letter via fax as soon as possible to [phone]. I certify the need for these services furnished under this plan of treatment and while under my care. X___________________________________________________ Date____________________    Certification From: 17/63/6956  To:2/18/2024 21st Century Cures Act Notice to Patient: Medical documents like this are made available to patients in the interest of transparency. However, be advised this is a medical document and it is intended as [id number] communication between your medical providers. This medical document may contain abbreviations, assessments, medical data, and results or other terms that are unfamiliar. Medical documents are intended to carry relevant information, facts as evident, and the clinical opinion of the practitioner. As such, this medical document may be written in language that appears blunt or direct. You are encouraged to contact your medical provider and/or [facility] Patient Experience if you have any questions about this medical document.

## (undated) NOTE — LETTER
08/10/23  [facility] Orthopedic Surgery   Pre-Operative Clearance Request    Patient Name:   Morteza Gonzalez             :   1990    Surgeon: Dr. Vel Jaramillo             Date of Surgery: 23     Surgical Procedure: RIGHT L5-S1 MICRODISCECTOMY. Please complete all of the following 2-3 weeks PRIOR TO your scheduled surgery to avoid potential cancellation. [x]  History and Physical        [x]  Medical  Clearance                    Required pre-op testing to be ordered:                        [x]  CBC W/Diff                                                                   [x]  BMP                                                                                      [x]  PT/INR    [x]  PTT     [x]  Type and Screen                     **Please fax test results, H&P, and clearance to [phone] and to [facility] at [phone]**

## (undated) NOTE — Clinical Note
Mike Payan,  Just saw patient at 6 weeks post op. Doing very well. Pre op pain resolved. Some residual numbness, likely continue to improve over time.

## (undated) NOTE — LETTER
Patient Name: Damon Scott  YOB: 1990          MRN number:  [id number]  Date:  2/12/2024  Referring Physician:  Deonna Barrett    Discharge Summary  I    Dear Dr. Barrett,    Diagnosis:     S/P lumbar microdiscectomy (Z98.890)    Referring Provider: Nena  Date of Evaluation:    10/11/2023    Precautions:   no lifting over 20 lbs s/p L5 microdisectomy on Sept 6, 2023.  Next MD visit:   none scheduled  Date of Surgery: Sept 6, 2023.    Insurance Primary/Secondary: [employer] [place] HMO / N/A     # Auth Visits: 18 authorized     (5 additional from start of new year)    Discharge Summary  Pt has attended 18 visits in Physical Therapy.      Subjective: Mat continues to have lingering foot numbness, that is constant. He does not have any back pain currently, can have some soreness with flexion but will cont to exercise this. He mentions great improvements since starting therapy, he was very tight today. He feels improved strength overall.   Pain:  Back pain:  0/10    Foot numbness: 1/10       Objective:       AROM: (* denotes performed with pain)  Flexion:80 deg improved from 62 deg (improved from 55 deg) with stretch in back R thigh   Extension: 20 deg improved from 7 deg; min limited (better overall improved lumbar ext)  Sidebending: B to 1\" past knee joint line, more limited L   Rotation: R min to nil limited (tightness); L nil limited (tightness)    SLR:   R:  improved from 50 deg (increased from 44 )(relief with plantar flexion)   L : 75 deg improved from 65 deg    Single leg heel raise: R  16x improved from 14 x (improved from 9); L 20      Strength: (* denotes performed with pain)  LE   Hip flexion (L2): R 5/5; L 5/5  Hip abduction: R NT/5; L NT/5  Hip Extension: R NT/5; L NT/5            Hip ER: R 5/5; L 5/5  Hip IR: R NT/5; L NT/5  Knee Flexion: R 5/5; L 5/5            Knee extension (L3): R 5/5; L 5/5            DF (L4): R 5/5; L 5/5  Great Toe Ext (L5): R 5/5, L 5/5  PF (S1): R 4/5; L 5/5          Assessment:         Pt demonstrates normal LE at this time, but is still limited with PF. His AROM lumbar spine is normal and so is his SLR. He continues to be limited by numbness in his R foot. Advised him to cont with is nerve glides for neural tension and to perform HEP. He has met the majority of his LTG and is to DC at this time.  He is free to DC with HEP at this time, follow up with PT and or MD in future as needed.     Goals:   (to be met in 13 visits)   Pt will improve transversus abdominis recruitment to perform proper isometric contraction without requiring verbal or tactile cuing to promote advancement of therex (MET). Goal met.  Pt will demonstrate good understanding of proper posture and body mechanics to decrease pain and improve spinal safety Goal met.  Pt will improve lumbar spine AROM ext to >10 deg to allow increase ease withstanding for 30 min at a time (in progress) Goal met.  Pt will report improved symptom centralization and absence of radicular symptoms for 3 consecutive days to improve function with ADL (in progress) Goal met.  Pt will improve SLR to 60 deg on the R to demo decreased neural tension. (Improving)   Pt will have decreased paraspinal mm tension to tolerate standing >40 minutes for work and home activities , MET upgraded to 1 hour   Pt will demonstrate improved core strength to be able to perform 10 squats with <2/10 pain (in progress)Goal met.  Pt will be independent and compliant with comprehensive HEP to maintain progress achieved in PT. Goal met.        68 % improvement  Post Oswestry Disability Index Score  Post Score: 10 % (11/20/2023 11:57 AM)    68 % improvement    Post Oswestry Disability Index Score  Post Score: 8 % (2/12/2024 11:11 AM)    70 % improvement    Plan: DC with HEP (See below), intermittently incorporated into routine.      Hep:  Access Code: [email or web address]  URL: [email or web address]  Date: 02/12/2024  Prepared by: Ariadna Gudino  -  Supine Sciatic Nerve Glide  - 1 x daily - 7 x weekly - 3 sets - 10 reps  - Full Superman on Table  - 1 x daily - 7 x weekly - 3 sets - 10 reps  - Superman with BOSU® Ball   - 1 x daily - 7 x weekly - 3 sets - 10 reps  - Bird Dog  - 1 x daily - 7 x weekly - 3 sets - 10 reps  - Supine Lower Trunk Rotation  - 1 x daily - 7 x weekly - 3 sets - 10 reps  - Cat Cow  - 1 x daily - 7 x weekly - 3 sets - 10 reps  - Standing Lumbar Extension  - 1 x daily - 7 x weekly - 3 sets - 10 reps  - Squat and Row with TRX®  - 1 x daily - 7 x weekly - 3 sets - 10 reps  - Lateral Lunge  - 1 x daily - 7 x weekly - 3 sets - 10 reps  - Reverse Lunge  - 1 x daily - 7 x weekly - 3 sets - 10 reps  - Single Leg Bridge  - 1 x daily - 7 x weekly - 3 sets - 10 reps  - Marching Bridge  - 1 x daily - 7 x weekly - 3 sets - 10 reps    Patient/Family/Caregiver was advised of these findings, precautions, and treatment options and has agreed to actively participate in planning and for this course of care.    Thank you for your referral. If you have any questions, please contact me at Dept: [phone].    Sincerely,  Electronically signed by therapist: Ariadna Barksdale PT , DPT    Physician's certification required:  No  Please co-sign or sign and return this letter via fax as soon as possible to [phone].   I certify the need for these services furnished under this plan of treatment and while under my care.    X___________________________________________________ Date____________________    Certification From: 2/12/2024  To:5/12/2024 21st Century Cures Act Notice to Patient: Medical documents like this are made available to patients in the interest of transparency. However, be advised this is a medical document and it is intended as [id number] communication between your medical providers. This medical document may contain abbreviations, assessments, medical data, and results or other terms that are unfamiliar. Medical documents are intended to  carry relevant information, facts as evident, and the clinical opinion of the practitioner. As such, this medical document may be written in language that appears blunt or direct. You are encouraged to contact your medical provider and/or [facility] Patient Experience if you have any questions about this medical document.

## (undated) NOTE — LETTER
OUTSIDE TESTING RESULT REQUEST     IMPORTANT: FOR YOUR IMMEDIATE ATTENTION  Please FAX all test results listed below to: [phone]      * * * * If testing is NOT complete, arrange with patient A.S.A.P. * * * *      Patient Name: [facility]  Surgery Date: 2023  Medical Record: [id number]  CSN: [id number]  : 1990 - A: 35 y     Sex: male  Surgeon(s):  Jonnie Glasgow MD  Procedure: Lumbar 5 - Sacrum 1 MICRODISCECTOMY  Anesthesia Type: General     Surgeon: Jonnie Glasgow MD     The following Testing and Time Line are REQUIRED PER ANESTHESIA     PT/INR within  30 days  PTT within  30 days  MSSA/MRSA Nasal screening within 30 days      Thank You,   Sent by: Atif Carballo RN